# Patient Record
Sex: MALE | Race: WHITE | Employment: FULL TIME | URBAN - METROPOLITAN AREA
[De-identification: names, ages, dates, MRNs, and addresses within clinical notes are randomized per-mention and may not be internally consistent; named-entity substitution may affect disease eponyms.]

---

## 2018-08-10 ENCOUNTER — HOSPITAL ENCOUNTER (OUTPATIENT)
Age: 23
Discharge: HOME | End: 2018-08-10
Attending: FAMILY MEDICINE
Payer: COMMERCIAL

## 2018-08-10 VITALS
SYSTOLIC BLOOD PRESSURE: 113 MMHG | HEART RATE: 92 BPM | OXYGEN SATURATION: 98 % | RESPIRATION RATE: 16 BRPM | WEIGHT: 175 LBS | BODY MASS INDEX: 24 KG/M2 | DIASTOLIC BLOOD PRESSURE: 80 MMHG | TEMPERATURE: 98 F

## 2018-08-10 DIAGNOSIS — H61.21 IMPACTED CERUMEN OF RIGHT EAR: Primary | ICD-10-CM

## 2018-08-10 PROCEDURE — 99203 OFFICE O/P NEW LOW 30 MIN: CPT

## 2018-08-10 PROCEDURE — 69209 REMOVE IMPACTED EAR WAX UNI: CPT

## 2018-08-10 RX ORDER — BUPROPION HYDROCHLORIDE 300 MG/1
300 TABLET ORAL DAILY
COMMUNITY
End: 2020-12-24 | Stop reason: ALTCHOICE

## 2018-08-10 RX ORDER — FLUOXETINE HYDROCHLORIDE 40 MG/1
40 CAPSULE ORAL DAILY
COMMUNITY
End: 2020-12-24 | Stop reason: ALTCHOICE

## 2018-08-10 RX ORDER — ALPRAZOLAM 0.5 MG/1
0.5 TABLET ORAL NIGHTLY PRN
COMMUNITY
End: 2021-12-15

## 2018-08-10 NOTE — ED PROVIDER NOTES
Patient Seen in: [address] [facility]    History   Patient presents with:  Ear Problem Pain (neurosensory)    Stated Complaint: ear wax removal     41-year-old male comes in with concerns of right ear fullness, muffled hearing since this carmen time. No distress. HENT:   Head: Normocephalic and atraumatic.    Right Ear: External ear normal.   Left Ear: Tympanic membrane, external ear and ear canal normal.   Nose: Nose normal.   Mouth/Throat: Oropharynx is clear and moist and mucous membranes are

## 2019-02-13 ENCOUNTER — WALK IN (OUTPATIENT)
Dept: URGENT CARE | Age: 24
End: 2019-02-13

## 2019-02-13 DIAGNOSIS — Z11.1 SCREENING-PULMONARY TB: Primary | ICD-10-CM

## 2019-02-13 PROCEDURE — 86580 TB INTRADERMAL TEST: CPT | Performed by: NURSE PRACTITIONER

## 2019-02-15 ENCOUNTER — WALK IN (OUTPATIENT)
Dept: URGENT CARE | Age: 24
End: 2019-02-15

## 2019-02-15 DIAGNOSIS — Z11.1 ENCOUNTER FOR PPD SKIN TEST READING: Primary | ICD-10-CM

## 2019-02-15 LAB
INDURATION: 0 MM (ref 0–?)
SKIN TEST RESULT: NEGATIVE

## 2019-02-15 PROCEDURE — X1094 NO CHARGE VISIT: HCPCS | Performed by: NURSE PRACTITIONER

## 2019-02-25 ENCOUNTER — WALK IN (OUTPATIENT)
Dept: URGENT CARE | Age: 24
End: 2019-02-25

## 2019-02-25 DIAGNOSIS — Z11.1 SCREENING-PULMONARY TB: Primary | ICD-10-CM

## 2019-02-25 PROCEDURE — 86580 TB INTRADERMAL TEST: CPT | Performed by: NURSE PRACTITIONER

## 2019-02-27 ENCOUNTER — WALK IN (OUTPATIENT)
Dept: URGENT CARE | Age: 24
End: 2019-02-27

## 2019-02-27 DIAGNOSIS — Z11.1 ENCOUNTER FOR PPD SKIN TEST READING: Primary | ICD-10-CM

## 2019-02-27 LAB
INDURATION: 0 MM (ref 0–?)
SKIN TEST RESULT: NEGATIVE

## 2019-02-27 PROCEDURE — X1094 NO CHARGE VISIT: HCPCS | Performed by: NURSE PRACTITIONER

## 2019-07-10 ENCOUNTER — OFFICE VISIT (OUTPATIENT)
Dept: OTHER | Age: 24
End: 2019-07-10
Attending: FAMILY MEDICINE

## 2019-07-10 DIAGNOSIS — Z02.1 PRE-EMPLOYMENT EXAMINATION: Primary | ICD-10-CM

## 2019-07-26 ENCOUNTER — OCC HEALTH (OUTPATIENT)
Dept: OTHER | Age: 24
End: 2019-07-26
Attending: FAMILY MEDICINE

## 2019-07-26 DIAGNOSIS — Z02.1 PRE-EMPLOYMENT EXAMINATION: Primary | ICD-10-CM

## 2019-07-26 PROCEDURE — 86480 TB TEST CELL IMMUN MEASURE: CPT

## 2019-07-30 LAB
M TB TUBERC IFN-G BLD QL: NEGATIVE
M TB TUBERC IFN-G/MITOGEN IGNF BLD: 0 IU/ML
M TB TUBERC IFN-G/MITOGEN IGNF BLD: 0 IU/ML
M TB TUBERC IGNF/MITOGEN IGNF CONTROL: >10 IU/ML
MITOGEN IGNF BCKGRD COR BLD-ACNC: 0.03 IU/ML

## 2020-10-02 ENCOUNTER — WALK IN (OUTPATIENT)
Dept: URGENT CARE | Age: 25
End: 2020-10-02

## 2020-10-02 VITALS — TEMPERATURE: 98 F

## 2020-10-02 DIAGNOSIS — Z11.1 SCREENING-PULMONARY TB: ICD-10-CM

## 2020-10-02 DIAGNOSIS — Z23 NEED FOR VACCINATION: Primary | ICD-10-CM

## 2020-10-02 PROCEDURE — 90471 IMMUNIZATION ADMIN: CPT | Performed by: NURSE PRACTITIONER

## 2020-10-02 PROCEDURE — 86580 TB INTRADERMAL TEST: CPT | Performed by: NURSE PRACTITIONER

## 2020-10-02 PROCEDURE — 90686 IIV4 VACC NO PRSV 0.5 ML IM: CPT | Performed by: NURSE PRACTITIONER

## 2020-10-05 ENCOUNTER — WALK IN (OUTPATIENT)
Dept: URGENT CARE | Age: 25
End: 2020-10-05

## 2020-10-05 DIAGNOSIS — Z11.1 ENCOUNTER FOR PPD SKIN TEST READING: Primary | ICD-10-CM

## 2020-10-05 LAB
INDURATION: 0 MM (ref 0–?)
SKIN TEST RESULT: NEGATIVE

## 2020-10-05 PROCEDURE — X1094 NO CHARGE VISIT: HCPCS | Performed by: NURSE PRACTITIONER

## 2020-11-13 ENCOUNTER — APPOINTMENT (OUTPATIENT)
Dept: OTHER | Facility: HOSPITAL | Age: 25
End: 2020-11-13
Attending: PREVENTIVE MEDICINE

## 2020-12-24 ENCOUNTER — OFFICE VISIT (OUTPATIENT)
Dept: INTERNAL MEDICINE CLINIC | Facility: CLINIC | Age: 25
End: 2020-12-24
Payer: COMMERCIAL

## 2020-12-24 VITALS
DIASTOLIC BLOOD PRESSURE: 62 MMHG | OXYGEN SATURATION: 99 % | BODY MASS INDEX: 22.68 KG/M2 | TEMPERATURE: 97 F | HEIGHT: 73.23 IN | HEART RATE: 72 BPM | WEIGHT: 173 LBS | RESPIRATION RATE: 18 BRPM | SYSTOLIC BLOOD PRESSURE: 104 MMHG

## 2020-12-24 DIAGNOSIS — M54.9 CHRONIC UPPER BACK PAIN: Primary | ICD-10-CM

## 2020-12-24 DIAGNOSIS — F33.1 DEPRESSION, MAJOR, RECURRENT, MODERATE (HCC): ICD-10-CM

## 2020-12-24 DIAGNOSIS — H57.9 VISUAL SYMPTOMS: ICD-10-CM

## 2020-12-24 DIAGNOSIS — F41.1 GENERALIZED ANXIETY DISORDER: ICD-10-CM

## 2020-12-24 DIAGNOSIS — F40.01 AGORAPHOBIA WITH PANIC DISORDER: ICD-10-CM

## 2020-12-24 DIAGNOSIS — G89.29 CHRONIC UPPER BACK PAIN: Primary | ICD-10-CM

## 2020-12-24 PROCEDURE — 3074F SYST BP LT 130 MM HG: CPT

## 2020-12-24 PROCEDURE — 3008F BODY MASS INDEX DOCD: CPT

## 2020-12-24 PROCEDURE — 96127 BRIEF EMOTIONAL/BEHAV ASSMT: CPT

## 2020-12-24 PROCEDURE — 99204 OFFICE O/P NEW MOD 45 MIN: CPT | Performed by: INTERNAL MEDICINE

## 2020-12-24 PROCEDURE — 3078F DIAST BP <80 MM HG: CPT

## 2020-12-24 RX ORDER — METHYLPREDNISOLONE 4 MG/1
TABLET ORAL
Qty: 1 KIT | Refills: 0 | Status: SHIPPED | OUTPATIENT
Start: 2020-12-24 | End: 2021-02-08

## 2020-12-24 RX ORDER — ESCITALOPRAM OXALATE 10 MG/1
20 TABLET ORAL DAILY
COMMUNITY
Start: 2020-12-13 | End: 2021-04-30

## 2020-12-24 RX ORDER — CYCLOBENZAPRINE HCL 10 MG
10 TABLET ORAL 2 TIMES DAILY PRN
Qty: 14 TABLET | Refills: 1 | Status: SHIPPED | OUTPATIENT
Start: 2020-12-24 | End: 2020-12-31

## 2020-12-24 NOTE — PROGRESS NOTES
Alex Silver  11/25/1995    Patient presents with:  Establish Care: RG rm 1 New pt to establish care, discuss chronic back pain x 4 months  Eye Problem: floaters      SUBJECTIVE   Alex Silver is a 31year old male who presents with lower back pain. mouth daily. • ALPRAZolam 0.5 MG Oral Tab Take 0.5 mg by mouth nightly as needed for Anxiety (not taken daily).         No Known Allergies   Past Medical History:   Diagnosis Date   • Anxiety    • Depression       Patient Active Problem List:     Depr consider plain film evaluation pending symptom progression with above    General anxiety, depression, and agoraphobia:  Acute worsening attributed to current chronic pain  Following with psychiatry service and compliant with Escitalopram therapy  Behaviora

## 2020-12-30 ENCOUNTER — PATIENT MESSAGE (OUTPATIENT)
Dept: INTERNAL MEDICINE CLINIC | Facility: CLINIC | Age: 25
End: 2020-12-30

## 2020-12-30 DIAGNOSIS — M54.9 UPPER BACK PAIN: Primary | ICD-10-CM

## 2020-12-30 NOTE — TELEPHONE ENCOUNTER
LOV 12/24/20 with AD.   Copied notes:    ASSESSMENT AND PLAN:   John Ge is a 31year old male who presents with lower back pain.     Chronic lower back pain:  No focal neurological deficits  No sustained improvement with manipulations by chiropractic

## 2020-12-30 NOTE — TELEPHONE ENCOUNTER
From: Albina Trinidad  To: Gisella Rogers MD  Sent: 12/30/2020 3:26 PM CST  Subject: Visit Follow-up Question    Hi Dr. Lisa Kelsey,  So I finished the prednisone. I did notice a decrease in my overall pain.  However I jogged for a quarter mile and my pain came back

## 2020-12-31 NOTE — TELEPHONE ENCOUNTER
Agree with imaging given the lack of response to the proposed regimen. I believe the patient and I discussed both, however, more lower back pain at current, rather than upper back pain. Please confirm.  If his symptoms are more of upper back I can change th

## 2021-01-15 ENCOUNTER — IMMUNIZATION (OUTPATIENT)
Dept: LAB | Facility: HOSPITAL | Age: 26
End: 2021-01-15
Attending: EMERGENCY MEDICINE
Payer: COMMERCIAL

## 2021-01-15 DIAGNOSIS — Z23 NEED FOR VACCINATION: Primary | ICD-10-CM

## 2021-01-15 PROCEDURE — 0011A SARSCOV2 VAC 100MCG/0.5ML IM: CPT

## 2021-01-19 ENCOUNTER — HOSPITAL ENCOUNTER (OUTPATIENT)
Dept: GENERAL RADIOLOGY | Facility: HOSPITAL | Age: 26
Discharge: HOME | End: 2021-01-19
Attending: INTERNAL MEDICINE
Payer: COMMERCIAL

## 2021-01-19 DIAGNOSIS — M54.9 UPPER BACK PAIN: ICD-10-CM

## 2021-01-19 PROCEDURE — 72072 X-RAY EXAM THORAC SPINE 3VWS: CPT | Performed by: INTERNAL MEDICINE

## 2021-01-22 ENCOUNTER — TELEPHONE (OUTPATIENT)
Dept: INTERNAL MEDICINE CLINIC | Facility: CLINIC | Age: 26
End: 2021-01-22

## 2021-02-02 ENCOUNTER — PATIENT MESSAGE (OUTPATIENT)
Dept: INTERNAL MEDICINE CLINIC | Facility: CLINIC | Age: 26
End: 2021-02-02

## 2021-02-02 NOTE — TELEPHONE ENCOUNTER
From: Jhon Ge  To: Dianna Bloch, MD  Sent: 2/2/2021 8:01 AM CST  Subject: Non-Urgent Halina Kocher Dr. Malvina Glassman,  My condition has going downhill recently. The last week has been really bad for my back.  My days off are spent in bed trying to r

## 2021-02-02 NOTE — TELEPHONE ENCOUNTER
PT seen 12/24/2020 in clinic - any other recommendations or would you like to see again in clinic for follow up care eval?

## 2021-02-03 NOTE — TELEPHONE ENCOUNTER
Since symptoms have changed and worsened, repeat evaluation would be beneficial, however, given the severity I recommend evaluation by the neurosurgery service.  He may schedule with Tomer Roque, or another member of his team. If needed I can add the pa

## 2021-02-08 ENCOUNTER — OFFICE VISIT (OUTPATIENT)
Dept: SURGERY | Facility: CLINIC | Age: 26
End: 2021-02-08
Payer: COMMERCIAL

## 2021-02-08 VITALS
BODY MASS INDEX: 22.29 KG/M2 | SYSTOLIC BLOOD PRESSURE: 110 MMHG | DIASTOLIC BLOOD PRESSURE: 68 MMHG | WEIGHT: 170 LBS | HEIGHT: 73.23 IN

## 2021-02-08 DIAGNOSIS — M54.12 CERVICAL MYELOPATHY WITH CERVICAL RADICULOPATHY (HCC): Primary | ICD-10-CM

## 2021-02-08 DIAGNOSIS — M54.2 CERVICAL PAIN: ICD-10-CM

## 2021-02-08 DIAGNOSIS — G95.9 CERVICAL MYELOPATHY WITH CERVICAL RADICULOPATHY (HCC): Primary | ICD-10-CM

## 2021-02-08 DIAGNOSIS — M47.812 CERVICAL SPONDYLOSIS: ICD-10-CM

## 2021-02-08 PROCEDURE — 3074F SYST BP LT 130 MM HG: CPT | Performed by: PHYSICIAN ASSISTANT

## 2021-02-08 PROCEDURE — 3008F BODY MASS INDEX DOCD: CPT | Performed by: PHYSICIAN ASSISTANT

## 2021-02-08 PROCEDURE — 99204 OFFICE O/P NEW MOD 45 MIN: CPT | Performed by: PHYSICIAN ASSISTANT

## 2021-02-08 PROCEDURE — 3078F DIAST BP <80 MM HG: CPT | Performed by: PHYSICIAN ASSISTANT

## 2021-02-08 RX ORDER — CYCLOBENZAPRINE HCL 10 MG
TABLET ORAL
COMMUNITY
Start: 2021-01-15 | End: 2021-03-02

## 2021-02-08 NOTE — PATIENT INSTRUCTIONS
Refill policies:    • Allow 2-3 business days for refills; controlled substances may take longer.   • Contact your pharmacy at least 5 days prior to running out of medication and have them send an electronic request or submit request through the “request re Depending on your insurance carrier, approval may take 3-10 days. It is highly recommended patients contact their insurance carrier directly to determine coverage.   If test is done without insurance authorization, patient may be responsible for the entire flexed postures as much as possible  Wall glides  Follow-up after imaging and will implement a treatment plan  His questions were answered

## 2021-02-08 NOTE — PROGRESS NOTES
About 6 months no injury or accident    Constant pain Pain 4/10 most of the time  Flares up 6-7/10    Can't really find a position to be comfortable  Left arm n/t down to fingers  Nothing in legs    Between shouldblade and spine on Left side is the worst a

## 2021-02-08 NOTE — PROGRESS NOTES
[facility] Neurosurgery Consultation      HISTORY OF PRESENT Kavitha Borrego is a 31year old right-handed male here for spinal evaluation. 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   He has been here about 3 months before that he was doing other back pain denies any pain down the legs no numbness tingling or weakness in the legs no cramping in the legs. He denies dropping things. He denies any bowel or bladder incontinence but does disclose urinary urgency.   Denies night sweats night pain or une T5-6 spondylosis. Has a mild thoracic scoliosis. No cervical scoliosis. The lateral does show up into the cervical spine he has loss of lordosis. He has spondylosis at C5-6 with slight kyphosis. He has spondylosis at C3-4 and C4-5.     ASSESSMENT:  C3-

## 2021-02-13 ENCOUNTER — IMMUNIZATION (OUTPATIENT)
Dept: LAB | Facility: HOSPITAL | Age: 26
End: 2021-02-13
Attending: EMERGENCY MEDICINE
Payer: COMMERCIAL

## 2021-02-13 DIAGNOSIS — Z23 NEED FOR VACCINATION: Primary | ICD-10-CM

## 2021-02-13 PROCEDURE — 0012A SARSCOV2 VAC 100MCG/0.5ML IM: CPT

## 2021-02-15 ENCOUNTER — PATIENT MESSAGE (OUTPATIENT)
Dept: SURGERY | Facility: CLINIC | Age: 26
End: 2021-02-15

## 2021-02-15 NOTE — TELEPHONE ENCOUNTER
From: Alba Douglas  To: DEYANIRA Kurtz  Sent: 2/15/2021 1:19 PM CST  Subject: Other    Hello,  I scheduled the MRI with [facility]. With my work schedule and their availability, I won't be able to get in until February 24th.  I just wanted to g

## 2021-02-16 RX ORDER — HYDROCODONE BITARTRATE AND ACETAMINOPHEN 5; 325 MG/1; MG/1
1 TABLET ORAL EVERY 8 HOURS PRN
Qty: 60 TABLET | Refills: 0 | Status: SHIPPED | OUTPATIENT
Start: 2021-02-16 | End: 2021-05-24

## 2021-02-20 ENCOUNTER — HOSPITAL ENCOUNTER (OUTPATIENT)
Dept: GENERAL RADIOLOGY | Facility: HOSPITAL | Age: 26
Discharge: HOME | End: 2021-02-20
Attending: PHYSICIAN ASSISTANT
Payer: COMMERCIAL

## 2021-02-20 DIAGNOSIS — M47.812 CERVICAL SPONDYLOSIS: ICD-10-CM

## 2021-02-20 DIAGNOSIS — M54.12 CERVICAL MYELOPATHY WITH CERVICAL RADICULOPATHY (HCC): ICD-10-CM

## 2021-02-20 DIAGNOSIS — M54.2 CERVICAL PAIN: ICD-10-CM

## 2021-02-20 DIAGNOSIS — G95.9 CERVICAL MYELOPATHY WITH CERVICAL RADICULOPATHY (HCC): ICD-10-CM

## 2021-02-20 PROCEDURE — 72050 X-RAY EXAM NECK SPINE 4/5VWS: CPT | Performed by: PHYSICIAN ASSISTANT

## 2021-02-24 ENCOUNTER — TELEPHONE (OUTPATIENT)
Dept: SURGERY | Facility: CLINIC | Age: 26
End: 2021-02-24

## 2021-03-02 ENCOUNTER — TELEPHONE (OUTPATIENT)
Dept: SURGERY | Facility: CLINIC | Age: 26
End: 2021-03-02

## 2021-03-02 ENCOUNTER — OFFICE VISIT (OUTPATIENT)
Dept: SURGERY | Facility: CLINIC | Age: 26
End: 2021-03-02
Payer: COMMERCIAL

## 2021-03-02 VITALS
SYSTOLIC BLOOD PRESSURE: 106 MMHG | BODY MASS INDEX: 23.03 KG/M2 | DIASTOLIC BLOOD PRESSURE: 62 MMHG | WEIGHT: 170 LBS | HEIGHT: 72 IN | HEART RATE: 63 BPM

## 2021-03-02 DIAGNOSIS — G95.9 CERVICAL MYELOPATHY WITH CERVICAL RADICULOPATHY (HCC): Primary | ICD-10-CM

## 2021-03-02 DIAGNOSIS — M54.12 CERVICAL MYELOPATHY WITH CERVICAL RADICULOPATHY (HCC): Primary | ICD-10-CM

## 2021-03-02 PROCEDURE — 3008F BODY MASS INDEX DOCD: CPT | Performed by: PHYSICIAN ASSISTANT

## 2021-03-02 PROCEDURE — 99214 OFFICE O/P EST MOD 30 MIN: CPT | Performed by: PHYSICIAN ASSISTANT

## 2021-03-02 PROCEDURE — 3078F DIAST BP <80 MM HG: CPT | Performed by: PHYSICIAN ASSISTANT

## 2021-03-02 PROCEDURE — 3074F SYST BP LT 130 MM HG: CPT | Performed by: PHYSICIAN ASSISTANT

## 2021-03-02 NOTE — PROGRESS NOTES
[facility]   Follow-up      HISTORY OF PRESENT Shellie Zhong is a 31year old right-handed male he has been using the soft cervical collar he thinks it may be helping his weakness but has not helped his neck pain and scapular pain he still much worse at night it gets extremely worse with trying to run. Exercising seems to make it worse as well. It is better try to use a foam roller and roll it out and also a firm ball for rolling it out. He gets occasional right hip pain.   Denies any back spine 1/19/2021 shows T4-5 T5-6 spondylosis. Has a mild thoracic scoliosis. No cervical scoliosis. The lateral does show up into the cervical spine he has loss of lordosis. He has spondylosis at C5-6 with slight kyphosis.   He has spondylosis at C3-4 an

## 2021-03-02 NOTE — PATIENT INSTRUCTIONS
Refill policies:    • Allow 2-3 business days for refills; controlled substances may take longer.   • Contact your pharmacy at least 5 days prior to running out of medication and have them send an electronic request or submit request through the “request re Depending on your insurance carrier, approval may take 3-10 days. It is highly recommended patients contact their insurance carrier directly to determine coverage.   If test is done without insurance authorization, patient may be responsible for the entire possible  Wall glides  Follow-up 8 weeks  Physical therapy  If he is refractory to therapy will consider some cervical facet injections and possible an epidural    Images were reviewed his questions were answered.   He appears to have dynamic myelopathy wit

## 2021-03-02 NOTE — PROGRESS NOTES
Patient here for follow-up and imaging review. States pain has been gradually worsening. Rates pain 4/10. Bending over and bad posture worsens pain. Feels like soft collar hasn't been helping.

## 2021-03-02 NOTE — TELEPHONE ENCOUNTER
[employer] collar order received from provider. Patient face sheet, insurance documents and last office visit notes printed and faxed with order to [facility] to fax number:  [phone].

## 2021-04-02 ENCOUNTER — TELEPHONE (OUTPATIENT)
Dept: PHYSICAL THERAPY | Facility: HOSPITAL | Age: 26
End: 2021-04-02

## 2021-04-12 ENCOUNTER — APPOINTMENT (OUTPATIENT)
Dept: GENERAL RADIOLOGY | Age: 26
End: 2021-04-12
Attending: PHYSICIAN ASSISTANT
Payer: COMMERCIAL

## 2021-04-12 NOTE — ED PROVIDER NOTES
Patient Seen in: [facility]      History   Patient presents with:   Anxiety/Panic attack    Stated Complaint: Dizziness; synscope    HPI/Subjective:   HPI    19-year-old male with a history of anxiety presents to the immediate care af Rate and Rhythm: Normal rate and regular rhythm. Pulmonary:      Effort: Pulmonary effort is normal.      Breath sounds: Normal breath sounds. Abdominal:      Palpations: Abdomen is soft. Tenderness: There is no abdominal tenderness.    Musculoskel and understands the risks of their decisions. Patient continues to wish to leave against medical advice and is willing to accept the risks, which include the potential for death or permanent disability. Patient signed out Against Medical Advice [facility]).  Pa

## 2021-04-12 NOTE — ED INITIAL ASSESSMENT (HPI)
Hx of panic/anxiety attacks. Denies any ideations. 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 States has been feeling anxious x 5 days. Cannot explain why. Has appointment w [facility] tomorrow. Denies CP or SOB. Denies syncope or dizziness.   States just feels \"discon

## 2021-04-20 ENCOUNTER — OFFICE VISIT (OUTPATIENT)
Dept: PHYSICAL THERAPY | Age: 26
End: 2021-04-20
Attending: PHYSICIAN ASSISTANT
Payer: COMMERCIAL

## 2021-04-20 DIAGNOSIS — M54.12 CERVICAL MYELOPATHY WITH CERVICAL RADICULOPATHY (HCC): ICD-10-CM

## 2021-04-20 DIAGNOSIS — G95.9 CERVICAL MYELOPATHY WITH CERVICAL RADICULOPATHY (HCC): ICD-10-CM

## 2021-04-20 PROCEDURE — 97162 PT EVAL MOD COMPLEX 30 MIN: CPT

## 2021-04-20 PROCEDURE — 97110 THERAPEUTIC EXERCISES: CPT

## 2021-04-20 NOTE — PATIENT INSTRUCTIONS
Access Code: [email or web address]  URL: [email or web address]  Date: 04/20/2021  Prepared by: Azael Feliciano    Exercises  Standing Scapular Retraction - 3 x daily - 7 x weekly - 3 sets - 10 reps  Neck Flexion Stretch - 3 x daily - 7 x weekly - 3 sets - 10 re

## 2021-04-20 NOTE — PROGRESS NOTES
SPINE EVALUATION:   Referring Physician: Dr. Lino Ocampo  Diagnosis: Cervical myel[facility] (G95.9,M54.12)      C4-5-6 anterior listhesis with mild myelopathy and CA pain Date of Service: 4/20/2021     PATIENT SUMMARY   Elfredia Fears speech recognition software technology. Despite thorough proofreading, speech recognition errors could escape detection. If a word or phrase is confusing or out of context, please do not hesitate to call for   clarification.      He works as a surgical care strength, but dermatomes and myotomes appear intact. Functional deficits include but are not limited to prolonged sitting, walking, lifting, running. Signs and symptoms are consistent with diagnosis of cervical myelopathy and radiculopathy.  Pt and PT dis plan, expectations, and prognosis.  Pt was also provided recommendations for activity modifications and possible soreness after evaluation  Patient was instructed in and issued a HEP for: scap sets as tolerated, supine serratus punch, cervical AROM flexion limitation: None  Rehab Potential:good    FOTO: 57/100    Patient/Family/Caregiver was advised of these findings, precautions, and treatment options and has agreed to actively participate in planning and for this course of care.     Thank you for your refer

## 2021-04-29 ENCOUNTER — OFFICE VISIT (OUTPATIENT)
Dept: PHYSICAL THERAPY | Age: 26
End: 2021-04-29
Attending: PHYSICIAN ASSISTANT
Payer: COMMERCIAL

## 2021-04-29 DIAGNOSIS — M54.12 CERVICAL MYELOPATHY WITH CERVICAL RADICULOPATHY (HCC): ICD-10-CM

## 2021-04-29 DIAGNOSIS — G95.9 CERVICAL MYELOPATHY WITH CERVICAL RADICULOPATHY (HCC): ICD-10-CM

## 2021-04-29 PROCEDURE — 97112 NEUROMUSCULAR REEDUCATION: CPT

## 2021-04-29 PROCEDURE — 97110 THERAPEUTIC EXERCISES: CPT

## 2021-04-29 PROCEDURE — 97140 MANUAL THERAPY 1/> REGIONS: CPT

## 2021-04-29 NOTE — PATIENT INSTRUCTIONS
Access Code: [email or web address]  URL: [email or web address]  Date: 04/29/2021  Prepared by: Jemima Astorga    Exercises  Supine Chin Tuck - 3 x daily - 7 x weekly - 10 reps - 10-30 sec hold  Ulnar Nerve Flossing - 8 x daily - 7 x weekly - 15 reps - 1 sets

## 2021-04-29 NOTE — PROGRESS NOTES
Dx: Cervical myelopathy with cervical radiculopathy (HCC) (G95.9,M54.12)      C4-5-6 anterior listhesis with mild myelopathy and CA pain         Insurance (Authorized # of Visits):  BCBS EPO 60 visit limit         Authorizing Physician: Dr. Shasta Yang mobility to WNL to improve cervical ROM as well as promote upright posturing and decreased pain with prolonged sitting and walking   · Pt will report decreased frequency of headaches to <2x/week  · Pt will improve postural strength (mid/low trap) to 4+/5 t

## 2021-04-30 RX ORDER — ESCITALOPRAM OXALATE 10 MG/1
20 TABLET ORAL DAILY
Qty: 180 TABLET | Refills: 0 | Status: SHIPPED | OUTPATIENT
Start: 2021-04-30 | End: 2021-07-22

## 2021-04-30 NOTE — TELEPHONE ENCOUNTER
Prescription Refill Request - Patient advised can take 48-72 hours.     Name of Medication (strength, dose, qty requested:   escitalopram 10 MG Oral Tab   12/13/2020    Sig:   Take 20 mg by mouth daily.       Route:   Oral           Which pharmacy:[facility]  Have

## 2021-05-04 ENCOUNTER — APPOINTMENT (OUTPATIENT)
Dept: PHYSICAL THERAPY | Age: 26
End: 2021-05-04
Attending: PHYSICIAN ASSISTANT
Payer: COMMERCIAL

## 2021-05-04 ENCOUNTER — TELEPHONE (OUTPATIENT)
Dept: PHYSICAL THERAPY | Age: 26
End: 2021-05-04

## 2021-05-04 NOTE — TELEPHONE ENCOUNTER
LVM regarding missed appointment today. Left call back number if patient is able to come in later today.

## 2021-05-06 ENCOUNTER — APPOINTMENT (OUTPATIENT)
Dept: PHYSICAL THERAPY | Age: 26
End: 2021-05-06
Attending: PHYSICIAN ASSISTANT
Payer: COMMERCIAL

## 2021-05-06 ENCOUNTER — TELEPHONE (OUTPATIENT)
Dept: PHYSICAL THERAPY | Facility: HOSPITAL | Age: 26
End: 2021-05-06

## 2021-05-11 ENCOUNTER — PATIENT MESSAGE (OUTPATIENT)
Dept: SURGERY | Facility: CLINIC | Age: 26
End: 2021-05-11

## 2021-05-11 ENCOUNTER — TELEPHONE (OUTPATIENT)
Dept: PHYSICAL THERAPY | Age: 26
End: 2021-05-11

## 2021-05-11 DIAGNOSIS — M54.12 CERVICAL RADICULOPATHY: Primary | ICD-10-CM

## 2021-05-11 NOTE — TELEPHONE ENCOUNTER
Called regarding missed PT appointment this morning. This is his third missed appointment and we have been unable to contact him regarding these missed appointments. Future PT appointments will be cancelled at this time per department policy.  Encouraged to

## 2021-05-12 NOTE — TELEPHONE ENCOUNTER
S:  MyChart message from patient noted. B:   Per LOV notes from Dinora Hinson on 3/2:    \"MRI of the cervical spine again shows loss of lordosis. He has mild spondylosis at C4-5 and C5-6.   There is very subtle changes in the anterior cord shape at C

## 2021-05-12 NOTE — TELEPHONE ENCOUNTER
From: Williams Given  To: DEYANIRA Judd  Sent: 5/11/2021 4:48 PM CDT  Subject: Non-Urgent Medical Question    Hello . Sofy Hatfield,  I have been having a hard time doing physical therapy because of my shoulder pain and neck pain.  I have been have migra

## 2021-05-13 ENCOUNTER — APPOINTMENT (OUTPATIENT)
Dept: PHYSICAL THERAPY | Age: 26
End: 2021-05-13
Attending: PHYSICIAN ASSISTANT
Payer: COMMERCIAL

## 2021-05-18 ENCOUNTER — OFFICE VISIT (OUTPATIENT)
Dept: PHYSICAL THERAPY | Age: 26
End: 2021-05-18
Attending: PHYSICIAN ASSISTANT
Payer: COMMERCIAL

## 2021-05-18 ENCOUNTER — APPOINTMENT (OUTPATIENT)
Dept: PHYSICAL THERAPY | Age: 26
End: 2021-05-18
Attending: PHYSICIAN ASSISTANT
Payer: COMMERCIAL

## 2021-05-18 PROCEDURE — 97110 THERAPEUTIC EXERCISES: CPT

## 2021-05-18 PROCEDURE — 97140 MANUAL THERAPY 1/> REGIONS: CPT

## 2021-05-18 PROCEDURE — 97112 NEUROMUSCULAR REEDUCATION: CPT

## 2021-05-18 NOTE — PROGRESS NOTES
Dx: Cervical myelopathy with cervical radiculopathy (HCC) (G95.9,M54.12)      C4-5-6 anterior listhesis with mild myelopathy and CA pain         Insurance (Authorized # of Visits):  BCBS EPO 60 visit limit         Authorizing Physician: Dr. Yue Barth MD with DNF chin tuck on wall. No increase in pain following session.     Goals:   (to be met in 12 visits)   · Pt will improve cervical AROM flexion to 65 degrees to improve tolerance for looking down to tie shoes   · Pt will improve cervical AROM rotation to grade III  -L 1st rib mobilizations grade III  -Prone upper thoracic PAs grade III Manual  -L GH APand inferior glides grade III  -Cervical distraction grade III  -Suboccipital release  -L 1st rib mobilizations grade III    N Re-Ed  -Ulnar nerve glides in

## 2021-05-20 ENCOUNTER — APPOINTMENT (OUTPATIENT)
Dept: PHYSICAL THERAPY | Age: 26
End: 2021-05-20
Attending: PHYSICIAN ASSISTANT
Payer: COMMERCIAL

## 2021-05-20 ENCOUNTER — TELEPHONE (OUTPATIENT)
Dept: PHYSICAL THERAPY | Age: 26
End: 2021-05-20

## 2021-05-24 ENCOUNTER — TELEPHONE (OUTPATIENT)
Dept: NEUROLOGY | Facility: CLINIC | Age: 26
End: 2021-05-24

## 2021-05-24 ENCOUNTER — OFFICE VISIT (OUTPATIENT)
Dept: PAIN CLINIC | Facility: CLINIC | Age: 26
End: 2021-05-24
Payer: COMMERCIAL

## 2021-05-24 VITALS
WEIGHT: 170 LBS | HEART RATE: 80 BPM | SYSTOLIC BLOOD PRESSURE: 86 MMHG | BODY MASS INDEX: 23 KG/M2 | OXYGEN SATURATION: 98 % | DIASTOLIC BLOOD PRESSURE: 60 MMHG

## 2021-05-24 DIAGNOSIS — M89.8X1 CHRONIC SCAPULAR PAIN: Primary | ICD-10-CM

## 2021-05-24 DIAGNOSIS — G89.29 CHRONIC SCAPULAR PAIN: Primary | ICD-10-CM

## 2021-05-24 DIAGNOSIS — M54.12 CERVICAL RADICULOPATHY: Primary | ICD-10-CM

## 2021-05-24 PROCEDURE — 99243 OFF/OP CNSLTJ NEW/EST LOW 30: CPT | Performed by: ANESTHESIOLOGY

## 2021-05-24 PROCEDURE — 3078F DIAST BP <80 MM HG: CPT | Performed by: ANESTHESIOLOGY

## 2021-05-24 PROCEDURE — 3074F SYST BP LT 130 MM HG: CPT | Performed by: ANESTHESIOLOGY

## 2021-05-24 NOTE — TELEPHONE ENCOUNTER
Question Answer Comment   Anesthesia Type Local    Provider [facility]    Procedure Cervical EMERSON    Medical clearance requested (will send to Pain Navigator) No    Patient has Medicare coverage?  No    Comments (Please list entire procedure name h

## 2021-05-24 NOTE — TELEPHONE ENCOUNTER
Prior authorization request completed for: Cervical epidural steroid injection Authorization # NO authorization is required  Authorization dates: N/A  CPT codes approved: 47123  Number of visits/dates of service approved: 1  Physician: Evelia Pete  Facility:Lab/

## 2021-05-24 NOTE — PROGRESS NOTES
HPI/Subjective:   Patient ID: Filomena Davis is a 31year old male. HPI    History/Other:   Review of Systems  Current Outpatient Medications   Medication Sig Dispense Refill   • escitalopram 10 MG Oral Tab Take 2 tablets (20 mg total) by mouth daily.  1

## 2021-05-24 NOTE — H&P
Name: Raciel Cortés   : 1995   DOS: 2021     Chief complaint: Neck and scapular pain    History of present illness:  Raciel Cortés is a 31year old right-handed male who presents today for evaluation of neck pain.   This began in 2020 negative. Physical examination: Milad Duke is a 31year old male not in acute distress  BP (!) 86/60   Pulse 80   Wt 170 lb (77.1 kg)   SpO2 98%   BMI 23.06 kg/m²    The patient is awake, alert, oriented and corporative. He has a normal affect.  The patient including a trial of cervical epidural steroid injection. He may also benefit from trigger point injections of the scapular border.   He would like to move forward cervical epidural steroid injection first.  Additionally, he has a referral to an orthopedic

## 2021-05-27 ENCOUNTER — APPOINTMENT (OUTPATIENT)
Dept: GENERAL RADIOLOGY | Facility: HOSPITAL | Age: 26
End: 2021-05-27
Attending: ANESTHESIOLOGY
Payer: COMMERCIAL

## 2021-05-27 ENCOUNTER — HOSPITAL ENCOUNTER (OUTPATIENT)
Facility: HOSPITAL | Age: 26
Setting detail: HOSPITAL OUTPATIENT SURGERY
Discharge: HOME | End: 2021-05-27
Attending: ANESTHESIOLOGY | Admitting: ANESTHESIOLOGY
Payer: COMMERCIAL

## 2021-05-27 VITALS
OXYGEN SATURATION: 100 % | TEMPERATURE: 99 F | HEART RATE: 72 BPM | DIASTOLIC BLOOD PRESSURE: 73 MMHG | SYSTOLIC BLOOD PRESSURE: 115 MMHG | RESPIRATION RATE: 18 BRPM

## 2021-05-27 DIAGNOSIS — M54.12 CERVICAL RADICULOPATHY: ICD-10-CM

## 2021-05-27 PROCEDURE — B01B1ZZ FLUOROSCOPY OF SPINAL CORD USING LOW OSMOLAR CONTRAST: ICD-10-PCS | Performed by: ANESTHESIOLOGY

## 2021-05-27 PROCEDURE — 3E0R33Z INTRODUCTION OF ANTI-INFLAMMATORY INTO SPINAL CANAL, PERCUTANEOUS APPROACH: ICD-10-PCS | Performed by: ANESTHESIOLOGY

## 2021-05-27 RX ORDER — SODIUM CHLORIDE, SODIUM LACTATE, POTASSIUM CHLORIDE, CALCIUM CHLORIDE 600; 310; 30; 20 MG/100ML; MG/100ML; MG/100ML; MG/100ML
100 INJECTION, SOLUTION INTRAVENOUS CONTINUOUS
Status: DISCONTINUED | OUTPATIENT
Start: 2021-05-27 | End: 2021-05-27

## 2021-05-27 RX ORDER — IBUPROFEN 600 MG/1
600 TABLET ORAL EVERY 6 HOURS PRN
COMMUNITY
End: 2021-10-04

## 2021-05-27 RX ORDER — ACETAMINOPHEN 325 MG/1
650 TABLET ORAL EVERY 6 HOURS PRN
Status: DISCONTINUED | OUTPATIENT
Start: 2021-05-27 | End: 2021-05-27

## 2021-05-27 RX ORDER — SODIUM CHLORIDE 9 MG/ML
INJECTION INTRAVENOUS
Status: DISCONTINUED | OUTPATIENT
Start: 2021-05-27 | End: 2021-05-27

## 2021-05-27 RX ORDER — ONDANSETRON 2 MG/ML
4 INJECTION INTRAMUSCULAR; INTRAVENOUS ONCE AS NEEDED
Status: DISCONTINUED | OUTPATIENT
Start: 2021-05-27 | End: 2021-05-27

## 2021-05-27 RX ORDER — DEXAMETHASONE SODIUM PHOSPHATE 10 MG/ML
INJECTION, SOLUTION INTRAMUSCULAR; INTRAVENOUS
Status: DISCONTINUED | OUTPATIENT
Start: 2021-05-27 | End: 2021-05-27

## 2021-05-27 RX ORDER — MORPHINE SULFATE 4 MG/ML
2 INJECTION, SOLUTION INTRAMUSCULAR; INTRAVENOUS EVERY 2 HOUR PRN
Status: DISCONTINUED | OUTPATIENT
Start: 2021-05-27 | End: 2021-05-27

## 2021-05-27 RX ORDER — HYDROCODONE BITARTRATE AND ACETAMINOPHEN 5; 325 MG/1; MG/1
1 TABLET ORAL EVERY 4 HOURS PRN
Status: DISCONTINUED | OUTPATIENT
Start: 2021-05-27 | End: 2021-05-27

## 2021-05-27 RX ORDER — DIPHENHYDRAMINE HYDROCHLORIDE 50 MG/ML
50 INJECTION INTRAMUSCULAR; INTRAVENOUS ONCE AS NEEDED
Status: DISCONTINUED | OUTPATIENT
Start: 2021-05-27 | End: 2021-05-27

## 2021-05-27 RX ORDER — LIDOCAINE HYDROCHLORIDE 10 MG/ML
INJECTION, SOLUTION EPIDURAL; INFILTRATION; INTRACAUDAL; PERINEURAL
Status: DISCONTINUED | OUTPATIENT
Start: 2021-05-27 | End: 2021-05-27

## 2021-05-27 NOTE — OPERATIVE REPORT
[facility]  Operative Report  2021     Albina Amos Patient Status:  Hospital Outpatient Surgery    1995 MRN [id number]   Location [id number] [place] [address] Attending Deisi Mandujano MD   Hosp Day # 0 PCP Gisella Rogers MD was discharged to a responsible adult after discharge criteria were met. Complications: None. Follow up: The patient was followed in the pain clinic as needed basis.           Silverio Lema MD

## 2021-05-27 NOTE — H&P
History & Physical Examination    Patient Name: Devang Noel  MRN: [id number]  CSN: [id number]  YOB: 1995    Pre-Operative Diagnosis:  Cervical radiculopathy [M54.12]    Present Illness: Patient with cervical radiculopathy for epidural ster

## 2021-06-03 ENCOUNTER — APPOINTMENT (OUTPATIENT)
Dept: PHYSICAL THERAPY | Age: 26
End: 2021-06-03
Attending: PHYSICIAN ASSISTANT
Payer: COMMERCIAL

## 2021-06-03 ENCOUNTER — OFFICE VISIT (OUTPATIENT)
Dept: INTERNAL MEDICINE CLINIC | Facility: CLINIC | Age: 26
End: 2021-06-03
Payer: COMMERCIAL

## 2021-06-03 ENCOUNTER — TELEPHONE (OUTPATIENT)
Dept: PHYSICAL THERAPY | Facility: HOSPITAL | Age: 26
End: 2021-06-03

## 2021-06-03 VITALS
DIASTOLIC BLOOD PRESSURE: 62 MMHG | BODY MASS INDEX: 20.45 KG/M2 | OXYGEN SATURATION: 100 % | SYSTOLIC BLOOD PRESSURE: 108 MMHG | WEIGHT: 156 LBS | HEART RATE: 88 BPM | HEIGHT: 73.23 IN | RESPIRATION RATE: 16 BRPM | TEMPERATURE: 98 F

## 2021-06-03 DIAGNOSIS — Z13.220 SCREENING FOR LIPID DISORDERS: ICD-10-CM

## 2021-06-03 DIAGNOSIS — F41.1 GENERALIZED ANXIETY DISORDER: ICD-10-CM

## 2021-06-03 DIAGNOSIS — G89.29 CHRONIC SCAPULAR PAIN: ICD-10-CM

## 2021-06-03 DIAGNOSIS — Z00.00 ROUTINE GENERAL MEDICAL EXAMINATION AT A HEALTH CARE FACILITY: Primary | ICD-10-CM

## 2021-06-03 DIAGNOSIS — Z13.228 SCREENING FOR METABOLIC DISORDER: ICD-10-CM

## 2021-06-03 DIAGNOSIS — Z13.29 SCREENING FOR THYROID DISORDER: ICD-10-CM

## 2021-06-03 DIAGNOSIS — M89.8X1 CHRONIC SCAPULAR PAIN: ICD-10-CM

## 2021-06-03 DIAGNOSIS — Z13.0 SCREENING FOR BLOOD DISEASE: ICD-10-CM

## 2021-06-03 DIAGNOSIS — F40.01 AGORAPHOBIA WITH PANIC DISORDER: ICD-10-CM

## 2021-06-03 PROCEDURE — 3078F DIAST BP <80 MM HG: CPT | Performed by: INTERNAL MEDICINE

## 2021-06-03 PROCEDURE — 3074F SYST BP LT 130 MM HG: CPT | Performed by: INTERNAL MEDICINE

## 2021-06-03 PROCEDURE — 3008F BODY MASS INDEX DOCD: CPT | Performed by: INTERNAL MEDICINE

## 2021-06-03 PROCEDURE — 99395 PREV VISIT EST AGE 18-39: CPT | Performed by: INTERNAL MEDICINE

## 2021-06-03 NOTE — PROGRESS NOTES
Jazlyn Xie  11/25/1995    Patient presents with:  Physical: RG rm 7 Physical      HPI:   Jazlyn Xei is a 31year old male who presents for an annual physical examination.     The patient continues to experience a left scapular pain, that is present Depression, major, recurrent, moderate (HCC)     Agoraphobia with panic disorder     Generalized anxiety disorder     Chronic scapular pain     Past Surgical History:   Procedure Laterality Date   • OTHER SURGICAL HISTORY  2006    nasal reconstruction focal neurological deficits  Post evaluation by neurosurgery and pain   Services referred to orthopedic surgery service for continued evaluation and management    Agoraphobia and generalized anxiety disorder:  Anxiety has worsened, attributed to chronic pa

## 2021-06-08 ENCOUNTER — APPOINTMENT (OUTPATIENT)
Dept: PHYSICAL THERAPY | Age: 26
End: 2021-06-08
Attending: PHYSICIAN ASSISTANT
Payer: COMMERCIAL

## 2021-06-08 ENCOUNTER — TELEPHONE (OUTPATIENT)
Dept: ORTHOPEDICS CLINIC | Facility: CLINIC | Age: 26
End: 2021-06-08

## 2021-06-08 ENCOUNTER — TELEPHONE (OUTPATIENT)
Dept: PHYSICAL THERAPY | Facility: HOSPITAL | Age: 26
End: 2021-06-08

## 2021-06-08 DIAGNOSIS — M25.512 LEFT SHOULDER PAIN, UNSPECIFIED CHRONICITY: Primary | ICD-10-CM

## 2021-06-08 NOTE — TELEPHONE ENCOUNTER
New patient with pain in left scapula. No imaging of scapula. Had Xray & MRI of C-Spine in February. Please order xray as needed.     Future Appointments   Date Time Provider Kalia Amy   6/21/2021  8:40 AM Deisi Randle MD [facility]

## 2021-06-08 NOTE — TELEPHONE ENCOUNTER
Reviewed patients chart, xray orders are required. Order placed, patient informed to arrive 30 mins prior to patients appt to complete XR order.  Patient verbalized agreement

## 2021-06-10 ENCOUNTER — APPOINTMENT (OUTPATIENT)
Dept: PHYSICAL THERAPY | Age: 26
End: 2021-06-10
Attending: PHYSICIAN ASSISTANT
Payer: COMMERCIAL

## 2021-06-10 ENCOUNTER — TELEPHONE (OUTPATIENT)
Dept: PHYSICAL THERAPY | Facility: HOSPITAL | Age: 26
End: 2021-06-10

## 2021-06-11 ENCOUNTER — TELEPHONE (OUTPATIENT)
Dept: ORTHOPEDICS CLINIC | Facility: CLINIC | Age: 26
End: 2021-06-11

## 2021-06-11 NOTE — TELEPHONE ENCOUNTER
Dr. Kitty Canavan, the patient's PCP, is requesting that the patient get in sooner to see Dr. Ned Vega, if at all possible. The patient is an employee at THE [employer]. Per Dr. Kitty Canavan, the patient's injury is \"dehabilitating. \"    I have placed the patient on the waiting li

## 2021-06-11 NOTE — TELEPHONE ENCOUNTER
Sooner appt scheduled for patient  Future Appointments   Date Time Provider Kalia Reyes   6/15/2021  7:20 AM Ruben Briggs MD [facility]

## 2021-06-15 ENCOUNTER — HOSPITAL ENCOUNTER (OUTPATIENT)
Dept: GENERAL RADIOLOGY | Age: 26
Discharge: HOME | End: 2021-06-15
Attending: ORTHOPAEDIC SURGERY
Payer: COMMERCIAL

## 2021-06-15 ENCOUNTER — OFFICE VISIT (OUTPATIENT)
Dept: ORTHOPEDICS CLINIC | Facility: CLINIC | Age: 26
End: 2021-06-15
Payer: COMMERCIAL

## 2021-06-15 ENCOUNTER — APPOINTMENT (OUTPATIENT)
Dept: PHYSICAL THERAPY | Age: 26
End: 2021-06-15
Attending: PHYSICIAN ASSISTANT
Payer: COMMERCIAL

## 2021-06-15 DIAGNOSIS — M25.512 LEFT SHOULDER PAIN, UNSPECIFIED CHRONICITY: ICD-10-CM

## 2021-06-15 DIAGNOSIS — S43.102A SEPARATION OF LEFT ACROMIOCLAVICULAR JOINT, INITIAL ENCOUNTER: ICD-10-CM

## 2021-06-15 DIAGNOSIS — M89.9 SCAPULAR DYSFUNCTION: Primary | ICD-10-CM

## 2021-06-15 PROCEDURE — 73030 X-RAY EXAM OF SHOULDER: CPT | Performed by: ORTHOPAEDIC SURGERY

## 2021-06-15 PROCEDURE — 99204 OFFICE O/P NEW MOD 45 MIN: CPT | Performed by: ORTHOPAEDIC SURGERY

## 2021-06-15 NOTE — H&P
[facility] - ORTHOPEDICS  [facility] [address] [id number]  [phone]     NEW PATIENT VISIT - HISTORY AND PHYSICAL EXAMINATION     Name: Rosi Florentino   MRN: [id number]  Date: 6/15/2021     CC: Left shoulder SURGICAL HISTORY  2006    nasal reconstruction       FAMILY HX:  Family History   Problem Relation Age of Onset   • Depression Mother    • Depression Maternal Grandmother    • Depression Brother    • Stroke Neg    • Heart Disease Neg    • Cancer Neg Neurological:      General: No focal deficit present. Mental Status: Alert. Psychiatric:         Mood and Affect: Mood normal.     Examination of the left shoulder demonstrates:     Skin is intact, warm and dry.    Cervical:  Full ROM  Spurling's  Ne unspecified chronicity  PATIENT STATED HISTORY: (As transcribed by Technologist)  Patient is here for orthopedic evaluation. He states he has had chronic pain to his left shoulder over the past 8 months. He denies injury.  He states recently he has heard a dyskinesis. He has been evaluated by  of pain management and received a cervical epidural injection which helped alleviate his neuropathic symptoms.   He continues to have a vague dull aching pain along the medial border of the scapula as well as

## 2021-06-17 ENCOUNTER — APPOINTMENT (OUTPATIENT)
Dept: PHYSICAL THERAPY | Age: 26
End: 2021-06-17
Attending: PHYSICIAN ASSISTANT
Payer: COMMERCIAL

## 2021-06-22 ENCOUNTER — APPOINTMENT (OUTPATIENT)
Dept: PHYSICAL THERAPY | Age: 26
End: 2021-06-22
Attending: PHYSICIAN ASSISTANT
Payer: COMMERCIAL

## 2021-06-24 ENCOUNTER — APPOINTMENT (OUTPATIENT)
Dept: PHYSICAL THERAPY | Age: 26
End: 2021-06-24
Attending: PHYSICIAN ASSISTANT
Payer: COMMERCIAL

## 2021-06-24 ENCOUNTER — TELEPHONE (OUTPATIENT)
Dept: INTERNAL MEDICINE CLINIC | Facility: CLINIC | Age: 26
End: 2021-06-24

## 2021-06-24 NOTE — TELEPHONE ENCOUNTER
See pt message 6/5/21    Pt came in to dropped off FMLA forms to be completed. Forms placed in AD file folder at the  and a copy in AD upcoming appt folder in the back.

## 2021-06-29 ENCOUNTER — APPOINTMENT (OUTPATIENT)
Dept: PHYSICAL THERAPY | Age: 26
End: 2021-06-29
Attending: PHYSICIAN ASSISTANT
Payer: COMMERCIAL

## 2021-07-06 ENCOUNTER — OFFICE VISIT (OUTPATIENT)
Dept: ORTHOPEDICS CLINIC | Facility: CLINIC | Age: 26
End: 2021-07-06
Payer: COMMERCIAL

## 2021-07-06 DIAGNOSIS — G44.059 SHORT LASTING UNILATERAL NEURALGIFORM HEADACHE WITH CONJUNCTIVAL INJECTION AND TEARING (SUNCT), NOT INTRACTABLE: Primary | ICD-10-CM

## 2021-07-06 PROCEDURE — 99213 OFFICE O/P EST LOW 20 MIN: CPT | Performed by: ORTHOPAEDIC SURGERY

## 2021-07-06 NOTE — PROGRESS NOTES
[facility]  [address]       Name: Page Campos   MRN: [id number]  Date: 7/6/2021     REASON FOR VISIT: Follow-up evaluation for left scapular pain and discomf Radiographic Examination/Diagnostics:    No new imaging obtained at this visit.       IMPRESSION: Teodora Quevedo is a 31year old left scapular dyskinesis and severe pain and discomfort that is undergoing nonsurgical management with dedicated physical thera

## 2021-07-21 NOTE — TELEPHONE ENCOUNTER
Not accepted because no indication of what patient is looking for in terms of reduced schedule (time off for flairs, reduced duration of work hours, etc...). Agree with patient's need for FMLA.   Please contact patient for these details and will be resubmit

## 2021-07-22 RX ORDER — ESCITALOPRAM OXALATE 10 MG/1
20 TABLET ORAL DAILY
Qty: 180 TABLET | Refills: 1 | Status: SHIPPED | OUTPATIENT
Start: 2021-07-22 | End: 2021-10-04

## 2021-07-22 NOTE — TELEPHONE ENCOUNTER
Last VISIT 06/03/21    Last REFILL 04/30/21 qty 180 w/0 refills    Last LABS 10/05/20 TB done    No Future Appointments      Per PROTOCOL? Not on protocol      Please Approve or Deny.

## 2021-07-26 NOTE — TELEPHONE ENCOUNTER
Pt coming to  [facility] paperwork. Alondra Salvador stated she will place in front folders for pt.

## 2021-08-04 ENCOUNTER — OFFICE VISIT (OUTPATIENT)
Dept: ORTHOPEDICS CLINIC | Facility: CLINIC | Age: 26
End: 2021-08-04
Payer: COMMERCIAL

## 2021-08-04 DIAGNOSIS — M25.312 SHOULDER INSTABILITY, LEFT: Primary | ICD-10-CM

## 2021-08-04 PROCEDURE — 99213 OFFICE O/P EST LOW 20 MIN: CPT | Performed by: ORTHOPAEDIC SURGERY

## 2021-08-04 NOTE — PROGRESS NOTES
[facility]  [address] [place] [phone]       Name: Jackelin Hdez   MRN: [id number]  Date: 8/4/2021    REASON FOR VISIT: Follow-up evaluation for left scapular pain and discomfo thumb test.    The contralateral upper extremity is without limitation in range of motion or strength, no positive provocative maneuvers. Radiographic Examination/Diagnostics:    No new imaging obtained at this visit.       IMPRESSION: Dorothy clayton

## 2021-08-30 ENCOUNTER — PATIENT MESSAGE (OUTPATIENT)
Dept: ORTHOPEDICS CLINIC | Facility: CLINIC | Age: 26
End: 2021-08-30

## 2021-08-30 DIAGNOSIS — M25.312 SHOULDER INSTABILITY, LEFT: Primary | ICD-10-CM

## 2021-09-03 ENCOUNTER — PATIENT MESSAGE (OUTPATIENT)
Dept: ORTHOPEDICS CLINIC | Facility: CLINIC | Age: 26
End: 2021-09-03

## 2021-09-03 DIAGNOSIS — G25.89 SCAPULAR DYSKINESIS: ICD-10-CM

## 2021-09-03 DIAGNOSIS — M25.312 SHOULDER INSTABILITY, LEFT: Primary | ICD-10-CM

## 2021-09-22 ENCOUNTER — TELEPHONE (OUTPATIENT)
Dept: ORTHOPEDICS CLINIC | Facility: CLINIC | Age: 26
End: 2021-09-22

## 2021-09-22 DIAGNOSIS — M25.312 INSTABILITY OF LEFT SHOULDER JOINT: Primary | ICD-10-CM

## 2021-09-22 NOTE — TELEPHONE ENCOUNTER
----- Message from Corrinne Robertson, RMA sent at 9/22/2021  3:11 PM CDT -----  Regarding: Max Brennan please enter surgery scheduling sheet pt is set for 10/4/21.  Thank you

## 2021-09-22 NOTE — TELEPHONE ENCOUNTER
OR BOOKING SHEET SHOULDER  Name: Lan Huynh  MRN: [id number]   : 1995  Diagnosis:  [x]? Shoulder instability, left [M25.312]  Disposition:    [x]? Ambulatory  []? Overnight for SABI  []? Overnight for observation and pain control  []?  Inpatient

## 2021-09-22 NOTE — TELEPHONE ENCOUNTER
----- Message from KEATON Gr sent at 9/22/2021  3:11 PM CDT -----  Regarding: Sanchez Cueva please enter surgery scheduling sheet pt is set for 10/4/21.  Thank you

## 2021-09-22 NOTE — TELEPHONE ENCOUNTER
Surgeon: Dr. Gilford Aus    Date of Surgery: 10/4/21       Post Op Appt: 10/11/21    Facility: [facility]    Inpatient or Outpatient: Outpatient    Surgical Assistant: no    Preadmission Testing Ordered:  yes    Pre-Op Clearance Requested:   PCP: n/a   Card

## 2021-09-22 NOTE — TELEPHONE ENCOUNTER
OR BOOKING SHEET SHOULDER  Name: Talha River  MRN: [id number]   : 1995  Diagnosis:  [x] Shoulder instability, left [M25.312]  Disposition:    [x] Ambulatory  [] Overnight for SABI  [] Overnight for observation and pain control  [] Inpatient proc

## 2021-09-23 NOTE — TELEPHONE ENCOUNTER
Lets have him come in for a preop visit with Sincer and get his sling and the handouts. Thanks!    ZA

## 2021-09-23 NOTE — TELEPHONE ENCOUNTER
Patient has [employer], no prior auth or precert is needed for OP CPT code 02786 , for patient tracking only

## 2021-09-23 NOTE — TELEPHONE ENCOUNTER
Shai Gary's case has been scheduled/rescheduled at [id number] on 10/4/2021 at [facility]  Received: Mlacolm Vela RMA  Patient Name: Jermaine Rowell    MRN: [id number]     LEFT SHOULDER ARTHROSCOPY STABILITY.

## 2021-09-23 NOTE — TELEPHONE ENCOUNTER
Patient has [employer], no prior auth or precert is needed for OP CPT code 338894 , for patient tracking only

## 2021-09-23 NOTE — TELEPHONE ENCOUNTER
Future Appointments   Date Time Provider Kalia Reyes   9/28/2021 12:00 PM DEYANIRA Huerta [facility]

## 2021-09-29 ENCOUNTER — OFFICE VISIT (OUTPATIENT)
Dept: ORTHOPEDICS CLINIC | Facility: CLINIC | Age: 26
End: 2021-09-29
Payer: COMMERCIAL

## 2021-09-29 VITALS — HEART RATE: 85 BPM | OXYGEN SATURATION: 98 %

## 2021-09-29 DIAGNOSIS — G25.89 SCAPULAR DYSKINESIS: Primary | ICD-10-CM

## 2021-09-29 PROCEDURE — 99214 OFFICE O/P EST MOD 30 MIN: CPT | Performed by: ORTHOPAEDIC SURGERY

## 2021-09-29 RX ORDER — CHOLECALCIFEROL (VITAMIN D3) 125 MCG
CAPSULE ORAL DAILY
COMMUNITY
End: 2021-12-08

## 2021-09-29 RX ORDER — MELATONIN 10 MG
CAPSULE ORAL AS NEEDED
COMMUNITY
End: 2021-12-08

## 2021-09-29 RX ORDER — ACETAMINOPHEN 500 MG
1000 TABLET ORAL ONCE
Status: CANCELLED | OUTPATIENT
Start: 2021-09-29 | End: 2021-09-29

## 2021-10-01 ENCOUNTER — MED REC SCAN ONLY (OUTPATIENT)
Dept: ORTHOPEDICS CLINIC | Facility: CLINIC | Age: 26
End: 2021-10-01

## 2021-10-01 ENCOUNTER — LAB ENCOUNTER (OUTPATIENT)
Dept: LAB | Age: 26
End: 2021-10-01
Attending: ORTHOPAEDIC SURGERY
Payer: COMMERCIAL

## 2021-10-01 DIAGNOSIS — M25.312 SHOULDER INSTABILITY, LEFT: ICD-10-CM

## 2021-10-04 ENCOUNTER — ANESTHESIA EVENT (OUTPATIENT)
Dept: SURGERY | Facility: HOSPITAL | Age: 26
End: 2021-10-04
Payer: COMMERCIAL

## 2021-10-04 ENCOUNTER — HOSPITAL ENCOUNTER (OUTPATIENT)
Facility: HOSPITAL | Age: 26
Setting detail: HOSPITAL OUTPATIENT SURGERY
Discharge: HOME | End: 2021-10-04
Attending: ORTHOPAEDIC SURGERY | Admitting: ORTHOPAEDIC SURGERY
Payer: COMMERCIAL

## 2021-10-04 ENCOUNTER — ANESTHESIA (OUTPATIENT)
Dept: SURGERY | Facility: HOSPITAL | Age: 26
End: 2021-10-04
Payer: COMMERCIAL

## 2021-10-04 VITALS
OXYGEN SATURATION: 97 % | HEART RATE: 108 BPM | DIASTOLIC BLOOD PRESSURE: 77 MMHG | HEIGHT: 73 IN | BODY MASS INDEX: 21.21 KG/M2 | TEMPERATURE: 98 F | RESPIRATION RATE: 18 BRPM | SYSTOLIC BLOOD PRESSURE: 136 MMHG | WEIGHT: 160.06 LBS

## 2021-10-04 DIAGNOSIS — M25.312 SHOULDER INSTABILITY, LEFT: Primary | ICD-10-CM

## 2021-10-04 DIAGNOSIS — M25.312 INSTABILITY OF LEFT SHOULDER JOINT: ICD-10-CM

## 2021-10-04 PROCEDURE — 3E0T3BZ INTRODUCTION OF ANESTHETIC AGENT INTO PERIPHERAL NERVES AND PLEXI, PERCUTANEOUS APPROACH: ICD-10-PCS | Performed by: ANESTHESIOLOGY

## 2021-10-04 PROCEDURE — 0RQK4ZZ REPAIR LEFT SHOULDER JOINT, PERCUTANEOUS ENDOSCOPIC APPROACH: ICD-10-PCS | Performed by: ORTHOPAEDIC SURGERY

## 2021-10-04 PROCEDURE — 0RBK4ZZ EXCISION OF LEFT SHOULDER JOINT, PERCUTANEOUS ENDOSCOPIC APPROACH: ICD-10-PCS | Performed by: ORTHOPAEDIC SURGERY

## 2021-10-04 RX ORDER — DEXAMETHASONE SODIUM PHOSPHATE 10 MG/ML
INJECTION, SOLUTION INTRAMUSCULAR; INTRAVENOUS AS NEEDED
Status: DISCONTINUED | OUTPATIENT
Start: 2021-10-04 | End: 2021-10-04 | Stop reason: SURG

## 2021-10-04 RX ORDER — CLONIDINE 100 UG/ML
INJECTION, SOLUTION EPIDURAL AS NEEDED
Status: DISCONTINUED | OUTPATIENT
Start: 2021-10-04 | End: 2021-10-04 | Stop reason: SURG

## 2021-10-04 RX ORDER — HYDROMORPHONE HYDROCHLORIDE 1 MG/ML
0.4 INJECTION, SOLUTION INTRAMUSCULAR; INTRAVENOUS; SUBCUTANEOUS EVERY 5 MIN PRN
Status: DISCONTINUED | OUTPATIENT
Start: 2021-10-04 | End: 2021-10-04

## 2021-10-04 RX ORDER — CEFAZOLIN SODIUM/WATER 2 G/20 ML
SYRINGE (ML) INTRAVENOUS
Status: DISCONTINUED
Start: 2021-10-04 | End: 2021-10-04

## 2021-10-04 RX ORDER — BUPIVACAINE HYDROCHLORIDE 2.5 MG/ML
INJECTION, SOLUTION EPIDURAL; INFILTRATION; INTRACAUDAL AS NEEDED
Status: DISCONTINUED | OUTPATIENT
Start: 2021-10-04 | End: 2021-10-04 | Stop reason: SURG

## 2021-10-04 RX ORDER — ACETAMINOPHEN 500 MG
1000 TABLET ORAL ONCE
COMMUNITY
End: 2021-10-04

## 2021-10-04 RX ORDER — SCOLOPAMINE TRANSDERMAL SYSTEM 1 MG/1
1 PATCH, EXTENDED RELEASE TRANSDERMAL ONCE
Status: DISCONTINUED | OUTPATIENT
Start: 2021-10-04 | End: 2021-10-04

## 2021-10-04 RX ORDER — SODIUM CHLORIDE, SODIUM LACTATE, POTASSIUM CHLORIDE, CALCIUM CHLORIDE 600; 310; 30; 20 MG/100ML; MG/100ML; MG/100ML; MG/100ML
INJECTION, SOLUTION INTRAVENOUS CONTINUOUS
Status: DISCONTINUED | OUTPATIENT
Start: 2021-10-04 | End: 2021-10-04

## 2021-10-04 RX ORDER — BUPRENORPHINE HYDROCHLORIDE 0.32 MG/ML
INJECTION INTRAMUSCULAR; INTRAVENOUS AS NEEDED
Status: DISCONTINUED | OUTPATIENT
Start: 2021-10-04 | End: 2021-10-04 | Stop reason: SURG

## 2021-10-04 RX ORDER — TRAMADOL HYDROCHLORIDE 50 MG/1
TABLET ORAL
Qty: 20 TABLET | Refills: 1 | Status: SHIPPED | OUTPATIENT
Start: 2021-10-04 | End: 2021-12-15

## 2021-10-04 RX ORDER — GLYCOPYRROLATE 0.2 MG/ML
INJECTION, SOLUTION INTRAMUSCULAR; INTRAVENOUS AS NEEDED
Status: DISCONTINUED | OUTPATIENT
Start: 2021-10-04 | End: 2021-10-04 | Stop reason: SURG

## 2021-10-04 RX ORDER — LIDOCAINE HYDROCHLORIDE 10 MG/ML
INJECTION, SOLUTION EPIDURAL; INFILTRATION; INTRACAUDAL; PERINEURAL AS NEEDED
Status: DISCONTINUED | OUTPATIENT
Start: 2021-10-04 | End: 2021-10-04 | Stop reason: SURG

## 2021-10-04 RX ORDER — NALOXONE HYDROCHLORIDE 0.4 MG/ML
80 INJECTION, SOLUTION INTRAMUSCULAR; INTRAVENOUS; SUBCUTANEOUS AS NEEDED
Status: DISCONTINUED | OUTPATIENT
Start: 2021-10-04 | End: 2021-10-04

## 2021-10-04 RX ORDER — TRANEXAMIC ACID 10 MG/ML
1000 INJECTION, SOLUTION INTRAVENOUS ONCE
Status: COMPLETED | OUTPATIENT
Start: 2021-10-04 | End: 2021-10-04

## 2021-10-04 RX ORDER — ONDANSETRON 2 MG/ML
INJECTION INTRAMUSCULAR; INTRAVENOUS AS NEEDED
Status: DISCONTINUED | OUTPATIENT
Start: 2021-10-04 | End: 2021-10-04 | Stop reason: SURG

## 2021-10-04 RX ORDER — HYDROCODONE BITARTRATE AND ACETAMINOPHEN 5; 325 MG/1; MG/1
2 TABLET ORAL AS NEEDED
Status: DISCONTINUED | OUTPATIENT
Start: 2021-10-04 | End: 2021-10-04

## 2021-10-04 RX ORDER — DEXAMETHASONE SODIUM PHOSPHATE 4 MG/ML
VIAL (ML) INJECTION AS NEEDED
Status: DISCONTINUED | OUTPATIENT
Start: 2021-10-04 | End: 2021-10-04 | Stop reason: SURG

## 2021-10-04 RX ORDER — MELOXICAM 15 MG/1
15 TABLET ORAL DAILY
Qty: 14 TABLET | Refills: 1 | Status: SHIPPED | OUTPATIENT
Start: 2021-10-04 | End: 2021-12-08

## 2021-10-04 RX ORDER — HYDROCODONE BITARTRATE AND ACETAMINOPHEN 5; 325 MG/1; MG/1
1 TABLET ORAL AS NEEDED
Status: DISCONTINUED | OUTPATIENT
Start: 2021-10-04 | End: 2021-10-04

## 2021-10-04 RX ORDER — PHENYLEPHRINE HCL 10 MG/ML
VIAL (ML) INJECTION AS NEEDED
Status: DISCONTINUED | OUTPATIENT
Start: 2021-10-04 | End: 2021-10-04 | Stop reason: SURG

## 2021-10-04 RX ORDER — MIDAZOLAM HYDROCHLORIDE 1 MG/ML
INJECTION INTRAMUSCULAR; INTRAVENOUS AS NEEDED
Status: DISCONTINUED | OUTPATIENT
Start: 2021-10-04 | End: 2021-10-04 | Stop reason: SURG

## 2021-10-04 RX ORDER — KETOROLAC TROMETHAMINE 30 MG/ML
INJECTION, SOLUTION INTRAMUSCULAR; INTRAVENOUS AS NEEDED
Status: DISCONTINUED | OUTPATIENT
Start: 2021-10-04 | End: 2021-10-04 | Stop reason: SURG

## 2021-10-04 RX ORDER — CEFAZOLIN SODIUM/WATER 2 G/20 ML
2 SYRINGE (ML) INTRAVENOUS ONCE
Status: COMPLETED | OUTPATIENT
Start: 2021-10-04 | End: 2021-10-04

## 2021-10-04 RX ORDER — ONDANSETRON 4 MG/1
4 TABLET, ORALLY DISINTEGRATING ORAL EVERY 8 HOURS PRN
Qty: 8 TABLET | Refills: 0 | Status: SHIPPED | OUTPATIENT
Start: 2021-10-04 | End: 2021-10-08

## 2021-10-04 RX ORDER — SCOLOPAMINE TRANSDERMAL SYSTEM 1 MG/1
PATCH, EXTENDED RELEASE TRANSDERMAL
Status: COMPLETED
Start: 2021-10-04 | End: 2021-10-04

## 2021-10-04 RX ORDER — KETAMINE HYDROCHLORIDE 50 MG/ML
INJECTION, SOLUTION, CONCENTRATE INTRAMUSCULAR; INTRAVENOUS AS NEEDED
Status: DISCONTINUED | OUTPATIENT
Start: 2021-10-04 | End: 2021-10-04 | Stop reason: SURG

## 2021-10-04 RX ADMIN — BUPRENORPHINE HYDROCHLORIDE 150 MCG: 0.32 INJECTION INTRAMUSCULAR; INTRAVENOUS at 07:20:00

## 2021-10-04 RX ADMIN — LIDOCAINE HYDROCHLORIDE 2 ML: 10 INJECTION, SOLUTION EPIDURAL; INFILTRATION; INTRACAUDAL; PERINEURAL at 07:20:00

## 2021-10-04 RX ADMIN — PHENYLEPHRINE HCL 50 MCG: 10 MG/ML VIAL (ML) INJECTION at 08:20:00

## 2021-10-04 RX ADMIN — TRANEXAMIC ACID 1000 MG: 10 INJECTION, SOLUTION INTRAVENOUS at 07:34:00

## 2021-10-04 RX ADMIN — PHENYLEPHRINE HCL 50 MCG: 10 MG/ML VIAL (ML) INJECTION at 07:39:00

## 2021-10-04 RX ADMIN — GLYCOPYRROLATE 0.2 MG: 0.2 INJECTION, SOLUTION INTRAMUSCULAR; INTRAVENOUS at 07:15:00

## 2021-10-04 RX ADMIN — CLONIDINE 50 MCG: 100 INJECTION, SOLUTION EPIDURAL at 07:20:00

## 2021-10-04 RX ADMIN — ONDANSETRON 4 MG: 2 INJECTION INTRAMUSCULAR; INTRAVENOUS at 07:32:00

## 2021-10-04 RX ADMIN — PHENYLEPHRINE HCL 50 MCG: 10 MG/ML VIAL (ML) INJECTION at 08:00:00

## 2021-10-04 RX ADMIN — KETAMINE HYDROCHLORIDE 10 MG: 50 INJECTION, SOLUTION, CONCENTRATE INTRAMUSCULAR; INTRAVENOUS at 07:15:00

## 2021-10-04 RX ADMIN — KETOROLAC TROMETHAMINE 30 MG: 30 INJECTION, SOLUTION INTRAMUSCULAR; INTRAVENOUS at 07:34:00

## 2021-10-04 RX ADMIN — BUPIVACAINE HYDROCHLORIDE 15 ML: 2.5 INJECTION, SOLUTION EPIDURAL; INFILTRATION; INTRACAUDAL at 07:20:00

## 2021-10-04 RX ADMIN — PHENYLEPHRINE HCL 50 MCG: 10 MG/ML VIAL (ML) INJECTION at 07:45:00

## 2021-10-04 RX ADMIN — CEFAZOLIN SODIUM/WATER 2 G: 2 G/20 ML SYRINGE (ML) INTRAVENOUS at 07:34:00

## 2021-10-04 RX ADMIN — DEXAMETHASONE SODIUM PHOSPHATE 8 MG: 4 MG/ML VIAL (ML) INJECTION at 07:32:00

## 2021-10-04 RX ADMIN — DEXAMETHASONE SODIUM PHOSPHATE 2 MG: 10 INJECTION, SOLUTION INTRAMUSCULAR; INTRAVENOUS at 07:20:00

## 2021-10-04 RX ADMIN — PHENYLEPHRINE HCL 50 MCG: 10 MG/ML VIAL (ML) INJECTION at 08:15:00

## 2021-10-04 RX ADMIN — MIDAZOLAM HYDROCHLORIDE 2 MG: 1 INJECTION INTRAMUSCULAR; INTRAVENOUS at 07:15:00

## 2021-10-04 NOTE — H&P
The below referenced H&P was reviewed by [facility], MD, the patient was examined and no significant changes have occurred in the patient's condition since the H&P was performed.   I discussed with the patient and/or legal representative the potenti range of motion and neck supple. Cardiovascular:      Pulses: Normal pulses. Pulmonary:      Effort: Pulmonary effort is normal. No respiratory distress. Abdominal:      General: There is no distension. Skin:     General: Skin is warm.       Jaymie Hart particular we discussed risks that include, but are not limited to infection, blood loss, potential transient or permanent injury to nerves or blood vessels, joint stiffness, persistent pain, need for future operation, failure of healing, wound complicatio

## 2021-10-04 NOTE — ANESTHESIA PROCEDURE NOTES
Airway  Date/Time: 10/4/2021 7:25 AM  Urgency: elective      General Information and Staff    Patient location during procedure: OR  Anesthesiologist: Austin Lucio MD  Performed: anesthesiologist     Indications and Patient Condition  Indications for airw

## 2021-10-04 NOTE — OPERATIVE REPORT
[facility] OPERATIVE RECORD  ATTENDING SURGEON: Alexandra Mejía MD  ASSISTANT(S): Monet Plata MD  PRELIMINARY DIAGNOSIS:  1. Left Posterior shoulder instability     POSTOPERATIVE DIAGNOSIS:  1.   Left Posterior shoulder instability repair.     Operative and nonoperative options were discussed with him in my office and we ultimately agreed that surgery would provide the highest likelihood of symptomatic relief and functional improvement.   The risks and benefits of surgery as well as t established within the rotator internal and a probe was introduced into the glenohumeral joint. Diagnostic arthroscopy revealed:    Biceps Denison Intact, mild erythema down the groove. Superior labrum  mild subtle type I SLAP tearing.    Anterior labrum was then additionally fixed into a knotless fiber tack anchor. This was then successively shuttled back cephalad and fixed after multiple passes into the anterior glenoid labrum with a fiber tack anchor. This was then shuttled posteriorly and secured.   Quin Rand

## 2021-10-04 NOTE — ANESTHESIA POSTPROCEDURE EVALUATION
[address] Patient Status:  Hospital Outpatient Surgery   Age/Gender 31year old male MRN [id number]   [facility] SURGERY Attending Macie Hewitt MD   Hosp Day # 0 PCP Sol Turner MD       Anesthesia Post-op Note

## 2021-10-04 NOTE — ANESTHESIA PREPROCEDURE EVALUATION
PRE-OP EVALUATION    Patient Name: Renu Sanchez    Admit Diagnosis: Instability of left shoulder joint [M25.312]    Pre-op Diagnosis: Instability of left shoulder joint [M25.312]    LEFT SHOULDER ARTHROSCOPY, STABILIZATION    Anesthesia Procedure: LEFT S GI/Hepatic/Renal    Negative GI/hepatic/renal ROS. Cardiovascular    Negative cardiovascular ROS. Exercise tolerance: good     MET: >4                                           Endo/Other    Negative endo/other ROS. expectation for block duration was discussed as well (in some cases up to 72hr).  I also explained possible downsides of peripheral nerve blocks including failed block, prolonged nerve blockade leading to prolonged numbness in upper extremity and hand, with

## 2021-10-04 NOTE — PROGRESS NOTES
[facility]  [address] [place] [address]       Name: Humza Dumont   MRN: [id number]  Date: 9/29/21    REASON FOR VISIT: Preoperative Visit for Posterior Labral Repair     INTERV thumb test.    The contralateral upper extremity is without limitation in range of motion or strength, no positive provocative maneuvers. Radiographic Examination/Diagnostics:    No new imaging obtained at this visit.       IMPRESSION: Haley Bhatt is surgery and obtain any medical clearance information necessary. My pre-operative information packet, which details the process and answers many FAQ's will be provided. He was encouraged to call the office with any further questions or concerns.     FOLLOW-U

## 2021-10-04 NOTE — BRIEF OP NOTE
Pre-Operative Diagnosis: Instability of left shoulder joint [M25.312]     Post-Operative Diagnosis: Instability of left shoulder joint [M25.312]      Procedure Performed:   LEFT SHOULDER ARTHROSCOPY, STABILIZATION    Surgeon(s) and Role:     * Anusha Baugh

## 2021-10-04 NOTE — ANESTHESIA PROCEDURE NOTES
Regional Block  Performed by: Jostin Newman MD  Authorized by: Jostin Newman MD       General Information and Staff    Start Time:  10/4/2021 7:18 AM  End Time:  10/4/2021 7:21 AM  Anesthesiologist:  Jostin Newman MD  Performed by:   Anesthesiologist  Kanchan Buprenorphine    Local anesthetic deposited outside the brachial plexus sheath in the interscalene grove. Pt maintained verbal feedback throughout the procedure. No paraesthesias noted.

## 2021-10-07 ENCOUNTER — PATIENT MESSAGE (OUTPATIENT)
Dept: ORTHOPEDICS CLINIC | Facility: CLINIC | Age: 26
End: 2021-10-07

## 2021-10-07 RX ORDER — TRAMADOL HYDROCHLORIDE 50 MG/1
TABLET ORAL
Qty: 20 TABLET | Refills: 1 | Status: CANCELLED | OUTPATIENT
Start: 2021-10-07

## 2021-10-07 RX ORDER — ONDANSETRON 4 MG/1
4 TABLET, ORALLY DISINTEGRATING ORAL EVERY 8 HOURS PRN
Qty: 8 TABLET | Refills: 0 | Status: CANCELLED | OUTPATIENT
Start: 2021-10-07

## 2021-10-07 NOTE — TELEPHONE ENCOUNTER
LOV: 9/29/21   RTC:    Disp Refills Start End    traMADol 50 MG Oral Tab 20 tablet 1 10/4/2021     Sig: Please take 1 to 2 tablets every 4 hours as needed for pain.     Sent to pharmacy as: traMADol HCl 50 MG Oral Tablet Lilo Haile    E-Prescribing Status: Re

## 2021-10-07 NOTE — TELEPHONE ENCOUNTER
From: Ailyn Huddleston  To: Ailin Chaidez MD  Sent: 10/7/2021 4:23 PM CDT  Subject: Refill on Rx    Hey Dr. Reynaldo Chris,  I'm doing very well overall so far. I'm looking forward to meeting up on Monday.  I was just hoping I could get a refill on the tramadol

## 2021-10-08 RX ORDER — ONDANSETRON 4 MG/1
TABLET, ORALLY DISINTEGRATING ORAL
Qty: 8 TABLET | Refills: 0 | Status: SHIPPED | OUTPATIENT
Start: 2021-10-08 | End: 2021-12-15

## 2021-10-08 NOTE — TELEPHONE ENCOUNTER
LOV: 9/29/21   Filled:   Medication Quantity Refills Start End   ondansetron (ZOFRAN ODT) 4 MG Oral Tablet Dispersible 8 tablet 0 10/4/2021    Sig:   Take 1 tablet (4 mg total) by mouth every 8 (eight) hours as needed for Nausea.        Future Appointments

## 2021-10-08 NOTE — TELEPHONE ENCOUNTER
Rajiv Hui    I'm sorry, I didn't see there was refills on the scripts already.  I had them refilled.     Thank you

## 2021-10-11 ENCOUNTER — OFFICE VISIT (OUTPATIENT)
Dept: ORTHOPEDICS CLINIC | Facility: CLINIC | Age: 26
End: 2021-10-11
Payer: COMMERCIAL

## 2021-10-11 VITALS — OXYGEN SATURATION: 97 % | HEART RATE: 80 BPM

## 2021-10-11 DIAGNOSIS — Z98.890 S/P ARTHROSCOPY OF SHOULDER: Primary | ICD-10-CM

## 2021-10-11 PROCEDURE — 99024 POSTOP FOLLOW-UP VISIT: CPT | Performed by: PHYSICIAN ASSISTANT

## 2021-10-11 NOTE — PROGRESS NOTES
[facility]  [address] [facility] [address]       Name: Kaila Hidalgo   MRN: [id number]  Date: 10/11/2021     REASON FOR VISIT: First Post-Surgical Visit   Surgery: Left shoulder ar are clean dry intact no signs at pathology. Tegaderms were removed and Steri-Strips were maintained in place. The contralateral shoulder is without limitation in range of motion or strength, no positive provocative maneuvers.        IMPRESSION: Chente Balbuena

## 2021-10-28 ENCOUNTER — PATIENT MESSAGE (OUTPATIENT)
Dept: ORTHOPEDICS CLINIC | Facility: CLINIC | Age: 26
End: 2021-10-28

## 2021-10-28 NOTE — TELEPHONE ENCOUNTER
Sia Brown [place] 10/28/2021 3:47 PM CDT    It's really early after surgery, offer reassurance and advise that if it persists past his next appointment we can consider referring him to pain management. Recommend taking tylenol max and tramadol.  Let me kn

## 2021-10-31 ENCOUNTER — PATIENT MESSAGE (OUTPATIENT)
Dept: ORTHOPEDICS CLINIC | Facility: CLINIC | Age: 26
End: 2021-10-31

## 2021-11-01 DIAGNOSIS — Z98.890 S/P ARTHROSCOPY OF SHOULDER: Primary | ICD-10-CM

## 2021-11-01 RX ORDER — HYDROCODONE BITARTRATE AND ACETAMINOPHEN 5; 325 MG/1; MG/1
1 TABLET ORAL EVERY 6 HOURS PRN
Qty: 30 TABLET | Refills: 0 | Status: SHIPPED | OUTPATIENT
Start: 2021-11-01 | End: 2021-11-18

## 2021-11-01 NOTE — TELEPHONE ENCOUNTER
Verified voicemail reached with verbal consent signed. Attempted to call Teodora Quevedo regarding Norco refill/only use when absolutely needed. Reached voicemail, left voicemail and provided a detail message with my call back contact information.

## 2021-11-08 ENCOUNTER — OFFICE VISIT (OUTPATIENT)
Dept: ORTHOPEDICS CLINIC | Facility: CLINIC | Age: 26
End: 2021-11-08
Payer: COMMERCIAL

## 2021-11-08 VITALS — BODY MASS INDEX: 21.87 KG/M2 | HEART RATE: 88 BPM | OXYGEN SATURATION: 99 % | WEIGHT: 165 LBS | HEIGHT: 73 IN

## 2021-11-08 DIAGNOSIS — Z98.890 S/P ARTHROSCOPY OF SHOULDER: Primary | ICD-10-CM

## 2021-11-08 PROCEDURE — 99024 POSTOP FOLLOW-UP VISIT: CPT | Performed by: PHYSICIAN ASSISTANT

## 2021-11-08 PROCEDURE — 3008F BODY MASS INDEX DOCD: CPT | Performed by: PHYSICIAN ASSISTANT

## 2021-11-08 RX ORDER — PSEUDOEPHED/ACETAMINOPH/DIPHEN 30MG-500MG
TABLET ORAL
COMMUNITY
Start: 2021-10-04 | End: 2021-11-18

## 2021-11-08 RX ORDER — GABAPENTIN 300 MG/1
CAPSULE ORAL
Qty: 86 CAPSULE | Refills: 0 | Status: SHIPPED | OUTPATIENT
Start: 2021-11-08 | End: 2021-12-15

## 2021-11-08 NOTE — PROGRESS NOTES
[facility]  [address]       Name: Daniel Murphy   MRN: [id number]  Date: 11/08/2021    REASON FOR VISIT: Second Post-Surgical Visit   Surgery: Left shoulder ar flexion, 45 degrees of external rotation and symmetric internal rotation. He has no signs of posterior shoulder instability. His neurovascular seems intact.     The contralateral shoulder is without limitation in range of motion or strength, no positive p

## 2021-11-17 ENCOUNTER — PATIENT MESSAGE (OUTPATIENT)
Dept: ORTHOPEDICS CLINIC | Facility: CLINIC | Age: 26
End: 2021-11-17

## 2021-11-18 ENCOUNTER — PATIENT MESSAGE (OUTPATIENT)
Dept: ORTHOPEDICS CLINIC | Facility: CLINIC | Age: 26
End: 2021-11-18

## 2021-11-18 RX ORDER — HYDROCODONE BITARTRATE AND ACETAMINOPHEN 5; 325 MG/1; MG/1
1 TABLET ORAL EVERY 6 HOURS PRN
Qty: 20 TABLET | Refills: 0 | Status: SHIPPED | OUTPATIENT
Start: 2021-11-18 | End: 2021-12-30

## 2021-11-29 RX ORDER — HYDROCODONE BITARTRATE AND ACETAMINOPHEN 5; 325 MG/1; MG/1
1 TABLET ORAL EVERY 6 HOURS PRN
Qty: 20 TABLET | Refills: 0 | Status: SHIPPED | OUTPATIENT
Start: 2021-11-29 | End: 2021-12-07

## 2021-11-29 NOTE — TELEPHONE ENCOUNTER
Driss Santos RN 11/29/2021 8:05 AM CST      ----- Message -----  From: Yosef Euceda  Sent: 11/26/2021 12:26 PM CST  To: [facility] Clinical Pool  Subject: Wagner Shelton,  Am I able to get a script for Norco to last until I see you

## 2021-12-03 ENCOUNTER — TELEPHONE (OUTPATIENT)
Dept: ORTHOPEDICS CLINIC | Facility: CLINIC | Age: 26
End: 2021-12-03

## 2021-12-03 RX ORDER — CLONAZEPAM 0.5 MG/1
0.5 TABLET ORAL NIGHTLY PRN
Qty: 14 TABLET | Refills: 0 | Status: SHIPPED | OUTPATIENT
Start: 2021-12-03 | End: 2021-12-15

## 2021-12-03 NOTE — TELEPHONE ENCOUNTER
lmom for patient that FMLA will not be valid with [employer] per the rep the request has been denied as of 10.27.21 due to the previous MD office not submitting paperwork.     Advised patient per the rep he needs to contact [employer] @ [phone] and beg

## 2021-12-09 ENCOUNTER — PATIENT MESSAGE (OUTPATIENT)
Dept: ORTHOPEDICS CLINIC | Facility: CLINIC | Age: 26
End: 2021-12-09

## 2021-12-09 RX ORDER — MELOXICAM 15 MG/1
15 TABLET ORAL DAILY
Qty: 28 TABLET | Refills: 1 | Status: SHIPPED | OUTPATIENT
Start: 2021-12-09 | End: 2021-12-15

## 2021-12-09 RX ORDER — HYDROCODONE BITARTRATE AND ACETAMINOPHEN 5; 325 MG/1; MG/1
1 TABLET ORAL EVERY 6 HOURS PRN
Qty: 20 TABLET | Refills: 0 | Status: SHIPPED | OUTPATIENT
Start: 2021-12-09 | End: 2021-12-15

## 2021-12-09 NOTE — TELEPHONE ENCOUNTER
From: Cecilia Kingsley  To: Louisa Preciado MD  Sent: 12/9/2021 11:26 AM CST  Subject: Clarise Fabry Dr. Wells Hammer,  What is the best way to get prescriptions refilled?  I tried putting a request in 48 hours ago, I'm out of pain meds and Everytime I call your o

## 2021-12-09 NOTE — TELEPHONE ENCOUNTER
I had a detailed discussion with Collette Footman regarding goals of reducing narcotic requirement and weaning down to just Tylenol and meloxicam.  He expressed understanding agreement.   Ultimately both agreed that one last prescription would be optimal to help him w

## 2021-12-09 NOTE — TELEPHONE ENCOUNTER
MD Nando Newsome Dr.,  What is the best way to get prescriptions refilled?  I tried putting a request in 48 hours ago, I'm out of pain meds and Everytime I call your office they just tell me they can't do anything bu

## 2021-12-15 ENCOUNTER — OFFICE VISIT (OUTPATIENT)
Dept: PAIN CLINIC | Facility: CLINIC | Age: 26
End: 2021-12-15
Payer: COMMERCIAL

## 2021-12-15 VITALS
WEIGHT: 165 LBS | OXYGEN SATURATION: 100 % | BODY MASS INDEX: 22 KG/M2 | SYSTOLIC BLOOD PRESSURE: 110 MMHG | HEART RATE: 79 BPM | DIASTOLIC BLOOD PRESSURE: 70 MMHG

## 2021-12-15 DIAGNOSIS — G89.29 CHRONIC SCAPULAR PAIN: Primary | ICD-10-CM

## 2021-12-15 DIAGNOSIS — M25.312 SHOULDER INSTABILITY, LEFT: ICD-10-CM

## 2021-12-15 DIAGNOSIS — M89.8X1 CHRONIC SCAPULAR PAIN: Primary | ICD-10-CM

## 2021-12-15 PROCEDURE — 3074F SYST BP LT 130 MM HG: CPT | Performed by: ANESTHESIOLOGY

## 2021-12-15 PROCEDURE — 3078F DIAST BP <80 MM HG: CPT | Performed by: ANESTHESIOLOGY

## 2021-12-15 PROCEDURE — 99214 OFFICE O/P EST MOD 30 MIN: CPT | Performed by: ANESTHESIOLOGY

## 2021-12-15 RX ORDER — METHOCARBAMOL 500 MG/1
500 TABLET, FILM COATED ORAL 3 TIMES DAILY
Qty: 90 TABLET | Refills: 0 | Status: SHIPPED | OUTPATIENT
Start: 2021-12-15 | End: 2022-01-12

## 2021-12-15 NOTE — PROGRESS NOTES
Patient presents in office today with reported pain in left shoulder into the scapula. Shooting pain into left bicep.      Current pain level reported = 4/10    Last reported dose of norco last night        Narcotic Contract renewal na    Urine Drug screen

## 2021-12-15 NOTE — PROGRESS NOTES
Name: Alba Douglas   : 1995   DOS: 12/15/2021     Pain Clinic Follow Up Visit:   Patient presents with:   Follow - Up: discuss pain aftr surgery       Alba Douglas is a 31year old male with a history of chronic left-sided scapular pain status Chronic scapular pain  (primary encounter diagnosis)  Shoulder instability, left     The patient is a 58-year-old gentleman with a longstanding history of left-sided scapular pain.   Has been treated with multiple medications and physical therapy along wi

## 2021-12-16 ENCOUNTER — TELEPHONE (OUTPATIENT)
Dept: NEUROLOGY | Facility: CLINIC | Age: 26
End: 2021-12-16

## 2021-12-16 NOTE — TELEPHONE ENCOUNTER
Availity online for authorization of approval for above.  Notification or Prior Authorization is not required for the requested services  Transaction ID: [id number]  Transaction Date: Dec 16 4:14 pm Customer ID: [id number]    Prior authorization request complet

## 2021-12-20 ENCOUNTER — PATIENT MESSAGE (OUTPATIENT)
Dept: ORTHOPEDICS CLINIC | Facility: CLINIC | Age: 26
End: 2021-12-20

## 2021-12-20 NOTE — TELEPHONE ENCOUNTER
Question Answer   Anesthesia Type Local   Provider [facility]   Medical clearance requested (will send to Pain Navigator) No   Patient has Medicare coverage?  No   Comments (Please list entire procedure name here.) left scapular border TPI

## 2021-12-20 NOTE — TELEPHONE ENCOUNTER
From: Ainsley Schwab  To: Doc Astorga MD  Sent: 12/20/2021 11:19 AM CST  Subject: Missed Jaz Axon Dr Jalen Odom,  I'm sorry for missing my appointment this morning.  I got it mixed up with PT and then by that time it was way to late to make my appoint

## 2021-12-22 ENCOUNTER — OFFICE VISIT (OUTPATIENT)
Dept: ORTHOPEDICS CLINIC | Facility: CLINIC | Age: 26
End: 2021-12-22
Payer: COMMERCIAL

## 2021-12-22 ENCOUNTER — PATIENT MESSAGE (OUTPATIENT)
Dept: ORTHOPEDICS CLINIC | Facility: CLINIC | Age: 26
End: 2021-12-22

## 2021-12-22 VITALS — BODY MASS INDEX: 21.87 KG/M2 | WEIGHT: 165 LBS | HEIGHT: 73 IN

## 2021-12-22 DIAGNOSIS — M54.6 CHRONIC LEFT-SIDED THORACIC BACK PAIN: ICD-10-CM

## 2021-12-22 DIAGNOSIS — G89.29 CHRONIC LEFT-SIDED THORACIC BACK PAIN: ICD-10-CM

## 2021-12-22 DIAGNOSIS — Z98.890 S/P ARTHROSCOPY OF SHOULDER: Primary | ICD-10-CM

## 2021-12-22 PROCEDURE — 3008F BODY MASS INDEX DOCD: CPT | Performed by: PHYSICIAN ASSISTANT

## 2021-12-22 PROCEDURE — 99024 POSTOP FOLLOW-UP VISIT: CPT | Performed by: PHYSICIAN ASSISTANT

## 2021-12-22 NOTE — PROGRESS NOTES
[facility]  [facility] [phone]       Name: Noel Colón   MRN: [id number]  Date: 12/22/2021     REASON FOR VISIT: Third Post-Surgical Visit   Surgery: Left shoulder ar distress. Patient she is 165 degrees of forward flexion, 45 degrees of external rotation, internal rotation T12. Patient has 5 out of 5 rotator cuff strength.   Incisional sites are clean dry intact no signs at pathology    The contralateral shoulder is w

## 2021-12-28 NOTE — TELEPHONE ENCOUNTER
appt has been scheduled. Please reach out if there are any specific instructions to provide.     Future Appointments   Date Time Provider Kalia Amy   1/12/2022  9:00 AM MD Td Dangeloin [facility] [place]

## 2021-12-29 ENCOUNTER — TELEPHONE (OUTPATIENT)
Dept: INTERNAL MEDICINE CLINIC | Facility: CLINIC | Age: 26
End: 2021-12-29

## 2021-12-30 ENCOUNTER — OFFICE VISIT (OUTPATIENT)
Dept: INTERNAL MEDICINE CLINIC | Facility: CLINIC | Age: 26
End: 2021-12-30
Payer: COMMERCIAL

## 2021-12-30 VITALS
OXYGEN SATURATION: 97 % | WEIGHT: 173.19 LBS | HEIGHT: 73 IN | HEART RATE: 85 BPM | DIASTOLIC BLOOD PRESSURE: 64 MMHG | TEMPERATURE: 99 F | BODY MASS INDEX: 22.95 KG/M2 | SYSTOLIC BLOOD PRESSURE: 118 MMHG

## 2021-12-30 DIAGNOSIS — F33.1 DEPRESSION, MAJOR, RECURRENT, MODERATE (HCC): ICD-10-CM

## 2021-12-30 DIAGNOSIS — G89.29 CHRONIC SCAPULAR PAIN: Primary | ICD-10-CM

## 2021-12-30 DIAGNOSIS — M89.8X1 CHRONIC SCAPULAR PAIN: Primary | ICD-10-CM

## 2021-12-30 DIAGNOSIS — F41.1 GENERALIZED ANXIETY DISORDER: ICD-10-CM

## 2021-12-30 PROCEDURE — 99214 OFFICE O/P EST MOD 30 MIN: CPT | Performed by: INTERNAL MEDICINE

## 2021-12-30 PROCEDURE — 3074F SYST BP LT 130 MM HG: CPT | Performed by: INTERNAL MEDICINE

## 2021-12-30 PROCEDURE — 3008F BODY MASS INDEX DOCD: CPT | Performed by: INTERNAL MEDICINE

## 2021-12-30 PROCEDURE — 3078F DIAST BP <80 MM HG: CPT | Performed by: INTERNAL MEDICINE

## 2021-12-30 RX ORDER — ZOLPIDEM TARTRATE 10 MG/1
10 TABLET ORAL NIGHTLY PRN
Qty: 30 TABLET | Refills: 0 | Status: SHIPPED | OUTPATIENT
Start: 2021-12-30 | End: 2022-01-12

## 2021-12-30 RX ORDER — HYDROCODONE BITARTRATE AND ACETAMINOPHEN 5; 325 MG/1; MG/1
1 TABLET ORAL DAILY PRN
Qty: 14 TABLET | Refills: 0 | Status: SHIPPED | OUTPATIENT
Start: 2021-12-30 | End: 2022-01-12

## 2021-12-30 NOTE — TELEPHONE ENCOUNTER
Patient states he has been having trouble with insomnia and heart racing for several weeks off and on. Pt has been dealing with post surgical pain for left shoulder labral repair and pain between his scapula and spine, constant at 5-6, tightness upper ribs on the left. Dr Beulah Richey told him the surgery would probably not fix his pain, has been seeing Dr Judye Duverney but next appt is not until 1/12/22. Not sure if the insomnia issues and racing heart is due to anxiety, has been off narcotics for 2 weeks, is taking Diclofenac and methocarbamol only which takes the edge off the pain. Pt states he is in PT 2 x's per week. Pt states he takes epsom salt bathes daily, tries distraction to help with anxiety and lack of sleep. Pt denies chest pain, shortness of breath. Please advise.

## 2021-12-30 NOTE — TELEPHONE ENCOUNTER
Called patient and rings all the way through, unable to leave VM. mychart sent for patient to call us to discuss symptoms.

## 2021-12-31 NOTE — PROGRESS NOTES
Audra Dunne  11/25/1995    Patient presents with:  Sleep Problem: mn room 6 pt here for sleeping concerns and heart racing       Jovan Bond is a 31year old male who presents as a follow-up.     The patient has a history of chronic right • OTHER SURGICAL HISTORY  2006    nasal reconstruction      Social History    Tobacco Use      Smoking status: Never Smoker      Smokeless tobacco: Never Used    Vaping Use      Vaping Use: Never used    Alcohol use: Never    Drug use: Yes      Frequency Refills:  Requested Prescriptions     Signed Prescriptions Disp Refills   • HYDROcodone-acetaminophen (NORCO) 5-325 MG Oral Tab 14 tablet 0     Sig: Take 1 tablet by mouth daily as needed for Pain.    • zolpidem 10 MG Oral Tab 30 tablet 0     Sig: Take 1 ta

## 2022-01-05 ENCOUNTER — TELEPHONE (OUTPATIENT)
Dept: ORTHOPEDICS CLINIC | Facility: CLINIC | Age: 27
End: 2022-01-05

## 2022-01-05 NOTE — TELEPHONE ENCOUNTER
Patient called because he was notified that he had to pay a $25.00 fee for forms completion. $25.00 was collected via credit card at the [facility].

## 2022-01-06 NOTE — TELEPHONE ENCOUNTER
The Physical Capacity Assessment Form has been placed on [facility]'s desk pending completion and signature so I can fax on 01.07.22

## 2022-01-11 ENCOUNTER — TELEPHONE (OUTPATIENT)
Dept: PHYSICAL MEDICINE AND REHAB | Facility: CLINIC | Age: 27
End: 2022-01-11

## 2022-01-12 DIAGNOSIS — M25.312 SHOULDER INSTABILITY, LEFT: ICD-10-CM

## 2022-01-12 DIAGNOSIS — G89.29 CHRONIC SCAPULAR PAIN: Primary | ICD-10-CM

## 2022-01-12 DIAGNOSIS — M89.8X1 CHRONIC SCAPULAR PAIN: Primary | ICD-10-CM

## 2022-01-12 RX ORDER — ZOLPIDEM TARTRATE 10 MG/1
10 TABLET ORAL NIGHTLY PRN
Qty: 30 TABLET | Refills: 0 | Status: SHIPPED | OUTPATIENT
Start: 2022-01-12 | End: 2022-01-24

## 2022-01-12 RX ORDER — HYDROCODONE BITARTRATE AND ACETAMINOPHEN 5; 325 MG/1; MG/1
1 TABLET ORAL DAILY PRN
Qty: 14 TABLET | Refills: 0 | Status: SHIPPED | OUTPATIENT
Start: 2022-01-12 | End: 2022-01-24

## 2022-01-12 RX ORDER — METHOCARBAMOL 500 MG/1
500 TABLET, FILM COATED ORAL 3 TIMES DAILY
Qty: 90 TABLET | Refills: 0 | Status: SHIPPED | OUTPATIENT
Start: 2022-01-12

## 2022-01-12 NOTE — TELEPHONE ENCOUNTER
Medication: methocarbamol 500 MG Oral Tab    Date of last refill: 12/15/21        Medication: diclofenac 50 MG Oral Tab EC    Date of last refill: 12/15/21      Last office visit: 12/15/21  Due back to clinic per last office note:  na  Date next office vis

## 2022-01-12 NOTE — TELEPHONE ENCOUNTER
Last VISIT 12/30/21    Last CPE 06/03/21    Last REFILL 12/30/21  zolpidem 10 MG Oral Tab 30 tablet 0     HYDROcodone-acetaminophen (NORCO) 5-325 MG Oral Tab 14 tablet 0       Last LABS 10/01/21 Covid test done    No future appointments. Per PROTOCOL?

## 2022-01-24 RX ORDER — ZOLPIDEM TARTRATE 10 MG/1
10 TABLET ORAL NIGHTLY PRN
Qty: 30 TABLET | Refills: 0 | Status: SHIPPED | OUTPATIENT
Start: 2022-01-24

## 2022-01-24 RX ORDER — HYDROCODONE BITARTRATE AND ACETAMINOPHEN 5; 325 MG/1; MG/1
1 TABLET ORAL DAILY PRN
Qty: 14 TABLET | Refills: 0 | Status: SHIPPED | OUTPATIENT
Start: 2022-01-24 | End: 2022-02-02

## 2022-01-24 NOTE — TELEPHONE ENCOUNTER
Last VISIT 12/30/21    Last CPE 06/03/21    Last REFILL 01/12/22  zolpidem 10 MG Oral Tab 30 tablet 0     HYDROcodone-acetaminophen (NORCO) 5-325 MG Oral Tab 14 tablet 0        Last LABS 10/01/21 Covid test done    No future appointments.       Per PROTOCOL

## 2022-01-25 ENCOUNTER — PATIENT MESSAGE (OUTPATIENT)
Dept: ORTHOPEDICS CLINIC | Facility: CLINIC | Age: 27
End: 2022-01-25

## 2022-01-25 NOTE — TELEPHONE ENCOUNTER
From: Spring Ocampo  To: Rina Gastelum MD  Sent: 1/25/2022 3:32 PM CST  Subject: Return to Work Note    Hi Dr. Naga Archibald,  I need a note stating that I can return to work in 50 hours of you could please, or I will be terminated from my position at work.

## 2022-02-03 RX ORDER — HYDROCODONE BITARTRATE AND ACETAMINOPHEN 5; 325 MG/1; MG/1
1 TABLET ORAL DAILY PRN
Qty: 14 TABLET | Refills: 0 | Status: SHIPPED | OUTPATIENT
Start: 2022-02-03 | End: 2022-02-17

## 2022-02-03 NOTE — TELEPHONE ENCOUNTER
Last visit- 12/30/2021    Last refill- 01/24/2022 hydrocodone-acetaminophen (norco) 5-325mg QTY14 0R    Last labs- no recent labs     No future appointments.     Per Protocol- Failed    Please approve or deny

## 2022-02-08 ENCOUNTER — OFFICE VISIT (OUTPATIENT)
Dept: PHYSICAL MEDICINE AND REHAB | Facility: CLINIC | Age: 27
End: 2022-02-08
Payer: COMMERCIAL

## 2022-02-08 VITALS
BODY MASS INDEX: 22.93 KG/M2 | OXYGEN SATURATION: 98 % | HEART RATE: 79 BPM | DIASTOLIC BLOOD PRESSURE: 54 MMHG | WEIGHT: 173 LBS | SYSTOLIC BLOOD PRESSURE: 106 MMHG | HEIGHT: 73 IN

## 2022-02-08 DIAGNOSIS — M25.512 CHRONIC LEFT SHOULDER PAIN: ICD-10-CM

## 2022-02-08 DIAGNOSIS — G89.29 CHRONIC LEFT SHOULDER PAIN: ICD-10-CM

## 2022-02-08 DIAGNOSIS — R20.0 NUMBNESS AND TINGLING IN LEFT HAND: Primary | ICD-10-CM

## 2022-02-08 DIAGNOSIS — R20.2 NUMBNESS AND TINGLING IN LEFT HAND: Primary | ICD-10-CM

## 2022-02-08 PROCEDURE — 3008F BODY MASS INDEX DOCD: CPT | Performed by: PHYSICAL MEDICINE & REHABILITATION

## 2022-02-08 PROCEDURE — 99244 OFF/OP CNSLTJ NEW/EST MOD 40: CPT | Performed by: PHYSICAL MEDICINE & REHABILITATION

## 2022-02-08 PROCEDURE — 3074F SYST BP LT 130 MM HG: CPT | Performed by: PHYSICAL MEDICINE & REHABILITATION

## 2022-02-08 PROCEDURE — 3078F DIAST BP <80 MM HG: CPT | Performed by: PHYSICAL MEDICINE & REHABILITATION

## 2022-02-08 RX ORDER — FLUOXETINE HYDROCHLORIDE 20 MG/1
20 CAPSULE ORAL DAILY
COMMUNITY

## 2022-02-16 ENCOUNTER — TELEPHONE (OUTPATIENT)
Dept: PHYSICAL MEDICINE AND REHAB | Facility: CLINIC | Age: 27
End: 2022-02-16

## 2022-02-16 NOTE — TELEPHONE ENCOUNTER
Patient wants to speak to a nurse regarding what he can do for his pain. Please call to discuss, thanks.

## 2022-02-17 RX ORDER — CELECOXIB 200 MG/1
200 CAPSULE ORAL DAILY
Qty: 30 CAPSULE | Refills: 0 | Status: SHIPPED | OUTPATIENT
Start: 2022-02-17 | End: 2022-03-11

## 2022-02-17 NOTE — TELEPHONE ENCOUNTER
Pt notified of Celebrex recommendation. Pt agreeable to try medication. Notified to stop diclofenac and notify us if no improvement. Pt verbalized understanding and agrees with plan.

## 2022-02-17 NOTE — TELEPHONE ENCOUNTER
Location of Pain: generalized back pain, rib pain, radiates to neck muscles   Old or new pain: Old  Date Pain Began: worsening over past 3-4 weeks, pain now constant    Numbness to left hand at baseline. Denies any new symptoms such as new N/T , weakness, or bb incontinence. Numeric Rating Scale:  Pain at Present: 6/10, constant  Minimum Pain: 5/10, when lying down and applies heat pad  Maximum Pain: 7/10, with activity such as lifting objects or doing housework    Quality of Pain: soreness, deep rib pain, sharp     Current pain treatment: Diclofenac, methocarbamol, Norco 1x day, Heat pads, Epsom salt baths. LOV: 2/8/22   LOV plan:   Recommend EMG of the left upper extremity for further evaluation of symptoms, MRI of the left shoulder. Depending on the findings consider TPI of periscapular musculature. NOV: 2/23/2022 Mliss Beverage, DO for EMG LUE  Pt is scheduled for shoulder MRI on 2/22/22 at [facility]. Reminded pt to bring in CD results. Summary of patient request: Advise on pain relief. Current pain regimen not working.

## 2022-02-18 ENCOUNTER — TELEPHONE (OUTPATIENT)
Dept: INTERNAL MEDICINE CLINIC | Facility: CLINIC | Age: 27
End: 2022-02-18

## 2022-02-18 RX ORDER — ZOLPIDEM TARTRATE 10 MG/1
10 TABLET ORAL NIGHTLY PRN
Qty: 30 TABLET | Refills: 0 | Status: SHIPPED | OUTPATIENT
Start: 2022-02-18 | End: 2022-03-19

## 2022-02-18 RX ORDER — HYDROCODONE BITARTRATE AND ACETAMINOPHEN 5; 325 MG/1; MG/1
1 TABLET ORAL DAILY PRN
Qty: 14 TABLET | Refills: 0 | Status: SHIPPED | OUTPATIENT
Start: 2022-02-18

## 2022-02-18 NOTE — TELEPHONE ENCOUNTER
Last VISIT 12/30/21    Last CPE 06/03/21    Last REFILL 01/24/22   zolpidem 10 MG Oral Tab 30 tablet 0   02/03/22   HYDROcodone-acetaminophen (NORCO) 5-325 MG Oral Tab 14 tablet 0       Last LABS 10/01/21 Covid test done    No Future Appointments      Per PROTOCOL? Not on protocol      Please Approve or Deny.

## 2022-02-18 NOTE — TELEPHONE ENCOUNTER
CMA called and discussed with pharmacy. Per pharmacy, pt picked up on 2/7/22 and is not due for refill until 2/20/22. This can be pushed through to fill early but pt will have to pay out of pocket. CMA called and discussed this with pt, pt is aware and ok to pay out of pocket and would like early fill called in. CMA called pharmacy back and advised ok to fill early.

## 2022-02-18 NOTE — TELEPHONE ENCOUNTER
Pt calling stating [facility] [place] [facility]'t [place] RX as it's too soon from the last script. Pt states he talked to AD last night and he said it was ok, Can we call pharmacy to give authorization to fill. HYDROcodone-acetaminophen (NORCO) 5-325 MG Oral Tab 14 tablet 0 2/18/2022     Sig - Route:  Take 1 tablet by mouth daily as needed for Pain. - Oral    Sent to pharmacy as: HYDROcodone-Acetaminophen 5-325 MG Oral Tablet    Earliest Fill Date: 2/18/2022

## 2022-02-23 ENCOUNTER — PROCEDURE VISIT (OUTPATIENT)
Dept: PHYSICAL MEDICINE AND REHAB | Facility: CLINIC | Age: 27
End: 2022-02-23
Payer: COMMERCIAL

## 2022-02-23 ENCOUNTER — TELEPHONE (OUTPATIENT)
Dept: PHYSICAL MEDICINE AND REHAB | Facility: CLINIC | Age: 27
End: 2022-02-23

## 2022-02-23 ENCOUNTER — MED REC SCAN ONLY (OUTPATIENT)
Dept: PHYSICAL MEDICINE AND REHAB | Facility: CLINIC | Age: 27
End: 2022-02-23

## 2022-02-23 DIAGNOSIS — M79.89 SOFT TISSUE MASS: Primary | ICD-10-CM

## 2022-02-23 DIAGNOSIS — G25.89 SCAPULAR DYSKINESIS: ICD-10-CM

## 2022-02-23 PROCEDURE — 95908 NRV CNDJ TST 3-4 STUDIES: CPT | Performed by: PHYSICAL MEDICINE & REHABILITATION

## 2022-02-23 PROCEDURE — 95886 MUSC TEST DONE W/N TEST COMP: CPT | Performed by: PHYSICAL MEDICINE & REHABILITATION

## 2022-02-23 NOTE — PROGRESS NOTES
Results discussed with patient upon conclusion of the visit. We also discussed MRI of the left shoulder, which in addition to post-op findings also made mention of a soft tissue mass along the posterior margins of the left posterior upper ribs, and MRI of the left upper chest wall recommended for further evaluation. I ordered an MRI of the chest for further evaluation.     Benita Hinson DO  Physical Medicine and [address]

## 2022-02-23 NOTE — TELEPHONE ENCOUNTER
[employer] for authorization of approval for MRI CHEST (W+WO) (CPT=71552). Authorization is not required. Transaction ID: [id number]. Pt.  informed.

## 2022-02-25 ENCOUNTER — TELEPHONE (OUTPATIENT)
Dept: INTERNAL MEDICINE CLINIC | Facility: CLINIC | Age: 27
End: 2022-02-25

## 2022-02-25 RX ORDER — HYDROCODONE BITARTRATE AND ACETAMINOPHEN 5; 325 MG/1; MG/1
1 TABLET ORAL 2 TIMES DAILY PRN
Qty: 28 TABLET | Refills: 0 | Status: SHIPPED | OUTPATIENT
Start: 2022-02-25 | End: 2022-03-11

## 2022-02-25 NOTE — TELEPHONE ENCOUNTER
Pt stated he's following up on his [employer] message from 2/23 and would like response back before the weekend.

## 2022-02-25 NOTE — PROGRESS NOTES
[facility] area [facility] of [place].  Pt asked that alt Rx for Nocro be sent to [facility]. I sent to [facility] as pt requested.

## 2022-03-01 ENCOUNTER — MED REC SCAN ONLY (OUTPATIENT)
Dept: ORTHOPEDICS CLINIC | Facility: CLINIC | Age: 27
End: 2022-03-01

## 2022-03-03 ENCOUNTER — PATIENT MESSAGE (OUTPATIENT)
Dept: PHYSICAL MEDICINE AND REHAB | Facility: CLINIC | Age: 27
End: 2022-03-03

## 2022-03-03 NOTE — TELEPHONE ENCOUNTER
From: Lew Davis  To: Giuseppe Mcmillan DO  Sent: 3/3/2022 12:19 PM CST  Subject: Azeb Raphael,  I was recently let go from [facility] for complicated reasons but because of this, I don't have health insurance until paperwork comes from the hospital and I file for it. So I will schedule that MRI as soon as I can. I'm very eager.   Thank you,  James Fitzgerald

## 2022-03-11 ENCOUNTER — PATIENT MESSAGE (OUTPATIENT)
Dept: INTERNAL MEDICINE CLINIC | Facility: CLINIC | Age: 27
End: 2022-03-11

## 2022-03-11 RX ORDER — HYDROCODONE BITARTRATE AND ACETAMINOPHEN 5; 325 MG/1; MG/1
1 TABLET ORAL 2 TIMES DAILY PRN
Qty: 28 TABLET | Refills: 0 | Status: CANCELLED | OUTPATIENT
Start: 2022-03-11

## 2022-03-11 RX ORDER — HYDROCODONE BITARTRATE AND ACETAMINOPHEN 5; 325 MG/1; MG/1
1 TABLET ORAL 2 TIMES DAILY PRN
Qty: 28 TABLET | Refills: 0 | Status: SHIPPED | OUTPATIENT
Start: 2022-03-11 | End: 2022-03-19

## 2022-03-11 RX ORDER — HYDROCODONE BITARTRATE AND ACETAMINOPHEN 5; 325 MG/1; MG/1
1 TABLET ORAL 2 TIMES DAILY PRN
Qty: 28 TABLET | Refills: 0 | Status: SHIPPED | OUTPATIENT
Start: 2022-03-11 | End: 2022-03-11

## 2022-03-14 RX ORDER — CELECOXIB 200 MG/1
200 CAPSULE ORAL DAILY PRN
Qty: 30 CAPSULE | Refills: 0 | Status: SHIPPED | OUTPATIENT
Start: 2022-03-14

## 2022-03-15 RX ORDER — ESCITALOPRAM OXALATE 10 MG/1
10 TABLET ORAL DAILY
Qty: 90 TABLET | Refills: 1 | Status: SHIPPED | OUTPATIENT
Start: 2022-03-15

## 2022-03-21 RX ORDER — ZOLPIDEM TARTRATE 10 MG/1
10 TABLET ORAL NIGHTLY PRN
Qty: 30 TABLET | Refills: 0 | Status: SHIPPED | OUTPATIENT
Start: 2022-03-21

## 2022-03-21 RX ORDER — HYDROCODONE BITARTRATE AND ACETAMINOPHEN 5; 325 MG/1; MG/1
1 TABLET ORAL 2 TIMES DAILY PRN
Qty: 28 TABLET | Refills: 0 | Status: SHIPPED | OUTPATIENT
Start: 2022-03-21

## 2022-03-21 NOTE — TELEPHONE ENCOUNTER
Last VISIT 12/30/21     Last CPE 06/03/21    Last REFILL 02/18/22  zolpidem 10 MG Oral Tab 30 tablet 0   03/11/22   HYDROcodone-acetaminophen (NORCO) 5-325 MG Oral Tab 28 tablet 0      Last LABS  10/01/21 Covid test done    No future appointments. Per PROTOCOL? Not on protocol      Please Approve or Deny.

## 2022-04-05 ENCOUNTER — TELEPHONE (OUTPATIENT)
Dept: PHYSICAL MEDICINE AND REHAB | Facility: CLINIC | Age: 27
End: 2022-04-05

## 2022-04-05 ENCOUNTER — MED REC SCAN ONLY (OUTPATIENT)
Dept: PHYSICAL MEDICINE AND REHAB | Facility: CLINIC | Age: 27
End: 2022-04-05

## 2022-04-06 RX ORDER — HYDROCODONE BITARTRATE AND ACETAMINOPHEN 5; 325 MG/1; MG/1
1 TABLET ORAL 2 TIMES DAILY PRN
Qty: 28 TABLET | Refills: 0 | Status: SHIPPED | OUTPATIENT
Start: 2022-04-06

## 2022-04-06 NOTE — TELEPHONE ENCOUNTER
Please inquire regarding a plan for his chronic symptoms. Is he planning to follow-up with the orthopedic surgery, pain, or PM&R services? I will refill his medication for a short duration as a bridge to the next step, but I cannot continue to manage his chronic pain with narcotic therapy.

## 2022-04-07 NOTE — TELEPHONE ENCOUNTER
Patient has been seeing Tamar Campuzano, seen by Alma Moreland on 2/8/2022 & 2/23/2022 and he no showed an appointment he had scheduled with pain management.

## 2022-04-08 NOTE — TELEPHONE ENCOUNTER
Left voice mail message for patient Dr Elizabeth Orellana  has reviewed MRI and has recommendations. Advised to return call to office. NOV none.

## 2022-04-08 NOTE — TELEPHONE ENCOUNTER
----- Message from Vivek Mg sent at 4/7/2022  7:13 AM CDT -----    ----- Message -----  From: Madan Chase DO  Sent: 4/6/2022   9:15 PM CDT  To: Miroslava Jiménez Nurse    Please let the patient know there is mild edema along the rhomboids muscles, muscles that stabilize the shoulder blade. Please have him follow up; we will consider trigger point injections along the muscle.

## 2022-04-11 ENCOUNTER — TELEPHONE (OUTPATIENT)
Dept: NEUROLOGY | Facility: CLINIC | Age: 27
End: 2022-04-11

## 2022-04-11 NOTE — TELEPHONE ENCOUNTER
Left rhomboid trigger point injections CPT CODE 21251 (79455 in needed),      Status: APPROVED. Pre authorization is not required    Availity online for authorization of approval for above.  Notification or Prior Authorization is not required for the requested services    Reference [id number]    Notified Approved Referrals for scheduling

## 2022-04-11 NOTE — TELEPHONE ENCOUNTER
S/w patient to go over Dr. Noel Romero MRI results. Would like to proceed with TPI.      Scheduled appt for 4/15/2022

## 2022-04-15 ENCOUNTER — TELEPHONE (OUTPATIENT)
Dept: PHYSICAL MEDICINE AND REHAB | Facility: CLINIC | Age: 27
End: 2022-04-15

## 2022-04-15 ENCOUNTER — TELEPHONE (OUTPATIENT)
Dept: INTERNAL MEDICINE CLINIC | Facility: CLINIC | Age: 27
End: 2022-04-15

## 2022-04-15 ENCOUNTER — OFFICE VISIT (OUTPATIENT)
Dept: PHYSICAL MEDICINE AND REHAB | Facility: CLINIC | Age: 27
End: 2022-04-15
Payer: COMMERCIAL

## 2022-04-15 DIAGNOSIS — M25.512 CHRONIC LEFT SHOULDER PAIN: Primary | ICD-10-CM

## 2022-04-15 DIAGNOSIS — M79.18 MYOFASCIAL PAIN: ICD-10-CM

## 2022-04-15 DIAGNOSIS — G89.29 CHRONIC LEFT SHOULDER PAIN: Primary | ICD-10-CM

## 2022-04-15 PROCEDURE — 20552 NJX 1/MLT TRIGGER POINT 1/2: CPT | Performed by: PHYSICAL MEDICINE & REHABILITATION

## 2022-04-15 RX ORDER — TRIAMCINOLONE ACETONIDE 40 MG/ML
20 INJECTION, SUSPENSION INTRA-ARTICULAR; INTRAMUSCULAR ONCE
Status: COMPLETED | OUTPATIENT
Start: 2022-04-15 | End: 2022-04-15

## 2022-04-15 RX ORDER — LIDOCAINE HYDROCHLORIDE 10 MG/ML
2.5 INJECTION, SOLUTION INFILTRATION; PERINEURAL ONCE
Status: COMPLETED | OUTPATIENT
Start: 2022-04-15 | End: 2022-04-15

## 2022-04-15 RX ORDER — DULOXETIN HYDROCHLORIDE 20 MG/1
20 CAPSULE, DELAYED RELEASE ORAL DAILY
Qty: 30 CAPSULE | Refills: 0 | Status: SHIPPED | OUTPATIENT
Start: 2022-04-15

## 2022-04-15 NOTE — PROCEDURES
Procedure note: Trigger point injections  Procedure Diagnosis: Myofascial pain    Summary of Procedure:   Risks and benefits of the procedure were discussed with the patient and consent was obtained. Trigger points were palpated and marked along the left rhomboids. Using betadine swabs, injection sites were cleaned in sterile fashion. Using a 27 gauge 1 1/2\" needle 20mg Kenolog diluted into 2.5cc of 1% Lidocaine was injected into the trigger points with 0.8-1 ml into each trigger point. There was negative aspiration of heme and no paresthesias were elicited. Patient tolerated the procedure well. Persistent shoulder pain. Referred to Kristofer Amaya, Joao Waller for second opinion, orthopedic surgery. Consider trial of duloxetine; would need to stop lexapro, would need to discuss this with PCP.     Denisse Leigh DO  Physical Medicine and [facility] [facility] [address]

## 2022-04-15 NOTE — TELEPHONE ENCOUNTER
Discussed case with Dr. Luz Marina Christie, who is agreeable for me to have him stop his Lexapro, start duloxetine 20 mg daily for persistent myofascial pain; may also help with symptoms of anxiety and depression. I discussed this over the phone with the patient, who is also agreeable. He was instructed to start the duloxetine today, since the last dose of Lexapro was yesterday, and to stop the lexapro from now on. He is no longer taking paroxetine; medication list updated. He will contact me in 1 week with a status update.     Samir Cortez DO  Physical Medicine and [address] [place] [address]

## 2022-04-15 NOTE — TELEPHONE ENCOUNTER
Dr. Vandana Hemphill stated he wants to talk to AD regarding this pt's medication. I advised AD is out and if he wanted to talk to anyone else, dr stated he left him a staff message as well.  ERNESTO

## 2022-04-18 ENCOUNTER — TELEPHONE (OUTPATIENT)
Dept: INTERNAL MEDICINE CLINIC | Facility: CLINIC | Age: 27
End: 2022-04-18

## 2022-04-18 RX ORDER — HYDROCODONE BITARTRATE AND ACETAMINOPHEN 5; 325 MG/1; MG/1
1 TABLET ORAL 2 TIMES DAILY PRN
Qty: 28 TABLET | Refills: 0 | Status: SHIPPED | OUTPATIENT
Start: 2022-04-18

## 2022-04-18 RX ORDER — ZOLPIDEM TARTRATE 10 MG/1
10 TABLET ORAL NIGHTLY PRN
Qty: 30 TABLET | Refills: 0 | Status: SHIPPED | OUTPATIENT
Start: 2022-04-18

## 2022-04-18 NOTE — TELEPHONE ENCOUNTER
Per AD, CMA called pharmacy to push through early fill of Suzette Ballard also called pt to make him aware this was done and he will need to pay out of pocket for early fill. Pt stated understanding. Pt also stated he received message from our office regarding weaning off Norco and will start to do that with this fill.    FYI AD

## 2022-04-18 NOTE — TELEPHONE ENCOUNTER
Pt calling stated he has some medication left of the 2 refills he is requesting but is leaving to [place] tomorrow for a week and isn't sure if he'll have cell service. Pt wants to know if he can get refills request filled today before he leaves.  High TE

## 2022-04-18 NOTE — TELEPHONE ENCOUNTER
Last VISIT 12/30/21    Last CPE 06/03/21    Last REFILL 03/21/22   zolpidem 10 MG Oral Tab 30 tablet 0   04/06/22   HYDROcodone-acetaminophen (NORCO) 5-325 MG Oral Tab 28 tablet 0       Last LABS 10/01/21 Covid test done    No future appointments. Please Approve or Deny.

## 2022-04-20 ENCOUNTER — PATIENT MESSAGE (OUTPATIENT)
Dept: PHYSICAL MEDICINE AND REHAB | Facility: CLINIC | Age: 27
End: 2022-04-20

## 2022-04-21 ENCOUNTER — TELEPHONE (OUTPATIENT)
Dept: PHYSICAL MEDICINE AND REHAB | Facility: CLINIC | Age: 27
End: 2022-04-21

## 2022-04-21 NOTE — TELEPHONE ENCOUNTER
LOV 4/15/22 trigger point injections left shoulder. 2/23/22 EMG. New patient initial office visit 2/8/22. No mention of lower back pain. NOV none. Left patient voice mail message to clarify where pain is on right side. Suggested to make NOV. Route to Dr Lane Lora condition update. Route to [facility] to make NOV.

## 2022-04-21 NOTE — TELEPHONE ENCOUNTER
From: Elaine Mo  To: Kelly Oconnor DO  Sent: 4/20/2022 4:09 PM CDT  Subject: Follow up     Hi Dr. Regulo Guzman got an appointment with Patrice Valdez. The other two people you referred said they are knee specialists so I wasn't able to be seen by them. The trigger point injections seems like it's helping with pain more than the other things I've tried. Would I be able to get injections on the right side of my back? The right side is really hurting much worse than the left the past few months.     Antony Su

## 2022-04-21 NOTE — TELEPHONE ENCOUNTER
Can get him set up for trigger point injections, right upper trapezius and rhomboids. Is he taking the duloxetine, and if so has he had any side effects from it?

## 2022-04-21 NOTE — TELEPHONE ENCOUNTER
Initiated authorization for  Trigger point injections right upper trapezius and rhomboids CPT 10254+ with [employer]  Coverage is active  Status: Approved

## 2022-04-25 NOTE — TELEPHONE ENCOUNTER
TCB 3rd attempt    Per Dr. Moira Rodriguez 4/20 T. E-   \"Is he taking the duloxetine, and if so has he had any side effects from it? \"

## 2022-05-02 RX ORDER — HYDROCODONE BITARTRATE AND ACETAMINOPHEN 5; 325 MG/1; MG/1
1 TABLET ORAL 2 TIMES DAILY PRN
Qty: 28 TABLET | Refills: 0 | OUTPATIENT
Start: 2022-05-02

## 2022-05-02 NOTE — TELEPHONE ENCOUNTER
Last OV 12/30/21  Last PE 6/3/21  Last REFILL   Medication Quantity Refills Start End   HYDROcodone-acetaminophen (NORCO) 5-325 MG Oral Tab 28 tablet 0 4/26/2022      Last LABS None on file     No future appointments. Per PROTOCOL?   Not on protocol     Please Advise

## 2022-05-03 NOTE — TELEPHONE ENCOUNTER
Refill Request    Medication request: celecoxib 200 MG Oral Cap    LOV:4/15/2022 Madan Chase DO   Due back to clinic per last office note:  N/A  NOV: Visit date not found      [place]: 3/4/22 #30    Urine drug screen (if applicable): None  Pain contract: None    LOV plan (if weaning or changing medications): None

## 2022-05-04 RX ORDER — CELECOXIB 200 MG/1
200 CAPSULE ORAL DAILY PRN
Qty: 30 CAPSULE | Refills: 0 | Status: SHIPPED | OUTPATIENT
Start: 2022-05-04

## 2022-05-09 ENCOUNTER — OFFICE VISIT (OUTPATIENT)
Dept: FAMILY MEDICINE CLINIC | Facility: CLINIC | Age: 27
End: 2022-05-09
Payer: COMMERCIAL

## 2022-05-09 VITALS
DIASTOLIC BLOOD PRESSURE: 70 MMHG | BODY MASS INDEX: 24.39 KG/M2 | HEIGHT: 73 IN | TEMPERATURE: 99 F | RESPIRATION RATE: 18 BRPM | SYSTOLIC BLOOD PRESSURE: 130 MMHG | OXYGEN SATURATION: 98 % | HEART RATE: 76 BPM | WEIGHT: 184 LBS

## 2022-05-09 DIAGNOSIS — H02.846 SWELLING OF GLAND OF LEFT EYELID: Primary | ICD-10-CM

## 2022-05-09 DIAGNOSIS — T78.40XA ALLERGIC REACTION, INITIAL ENCOUNTER: ICD-10-CM

## 2022-05-09 DIAGNOSIS — Z11.52 ENCOUNTER FOR SCREENING FOR COVID-19: ICD-10-CM

## 2022-05-09 RX ORDER — DULOXETIN HYDROCHLORIDE 20 MG/1
20 CAPSULE, DELAYED RELEASE ORAL DAILY
Qty: 30 CAPSULE | Refills: 0 | Status: SHIPPED | OUTPATIENT
Start: 2022-05-09

## 2022-05-09 RX ORDER — ZOLPIDEM TARTRATE 10 MG/1
10 TABLET ORAL NIGHTLY PRN
Qty: 30 TABLET | Refills: 0 | Status: SHIPPED | OUTPATIENT
Start: 2022-05-09

## 2022-05-09 RX ORDER — HYDROCODONE BITARTRATE AND ACETAMINOPHEN 5; 325 MG/1; MG/1
1 TABLET ORAL 2 TIMES DAILY PRN
Qty: 28 TABLET | Refills: 0 | Status: SHIPPED | OUTPATIENT
Start: 2022-05-09

## 2022-05-09 NOTE — TELEPHONE ENCOUNTER
Refill Request    Medication request: DULoxetine 20 MG Oral Cap DR Particles    LOV:4/15/2022 Sara Flores,    Due back to clinic per last office note:  N/A  NOV: Visit date not found      [place]: 4/15/22 #30    Urine drug screen (if applicable): None  Pain contract: None    LOV plan (if weaning or changing medications): N/A. Condition update: Spoke with patient states that sees no difference with the medication. Patient states that's he's been using an antidepressant so he doesn't mind to stay on this rx. Patient seeing Dr. Geronimo [place].

## 2022-05-09 NOTE — TELEPHONE ENCOUNTER
Last VISIT 12/30/21    Last CPE 06/03/21    Last REFILL 04/18/22   zolpidem 10 MG Oral Tab 30 tablet 0   04/26/22   HYDROcodone-acetaminophen (NORCO) 5-325 MG Oral Tab 28 tablet 0       Last LABS 10/01/21 Covid test done    No future appointments. Per PROTOCOL? Not on protocol      Please Approve or Deny.

## 2022-05-10 DIAGNOSIS — M79.18 MYOFASCIAL PAIN: ICD-10-CM

## 2022-05-10 DIAGNOSIS — G89.29 CHRONIC LEFT SHOULDER PAIN: ICD-10-CM

## 2022-05-10 DIAGNOSIS — M25.512 CHRONIC LEFT SHOULDER PAIN: ICD-10-CM

## 2022-05-10 LAB — SARS-COV-2 RNA RESP QL NAA+PROBE: NOT DETECTED

## 2022-05-10 RX ORDER — DULOXETIN HYDROCHLORIDE 20 MG/1
CAPSULE, DELAYED RELEASE ORAL
Qty: 30 CAPSULE | Refills: 0 | OUTPATIENT
Start: 2022-05-10

## 2022-05-24 ENCOUNTER — MED REC SCAN ONLY (OUTPATIENT)
Dept: NEUROLOGY | Facility: CLINIC | Age: 27
End: 2022-05-24

## 2022-05-24 ENCOUNTER — PATIENT MESSAGE (OUTPATIENT)
Dept: INTERNAL MEDICINE CLINIC | Facility: CLINIC | Age: 27
End: 2022-05-24

## 2022-05-24 RX ORDER — HYDROCODONE BITARTRATE AND ACETAMINOPHEN 5; 325 MG/1; MG/1
1 TABLET ORAL 2 TIMES DAILY PRN
Qty: 28 TABLET | Refills: 0 | Status: CANCELLED | OUTPATIENT
Start: 2022-05-24

## 2022-05-24 NOTE — TELEPHONE ENCOUNTER
AD if you can see him for 20 min we can get him in sooner but the appt was made for 40 min-ok to change to 20 and get in sooner or see other provider?

## 2022-05-24 NOTE — TELEPHONE ENCOUNTER
From: Lew Davis  To: Ximena Amaro MD  Sent: 5/24/2022 11:55 AM CDT  Subject: Upcoming appointment    Ching Brizuela,  I have an appointment scheduled for June 21. I was really hoping to see you sooner than that to start getting off of the [address]. I am not feeling very well recently and I believe the [address] is playing some role in that. I understand if that's the earliest appointment available, but if something else opens up I would come in at anytime. I'm going to meet a doctor at the [facility] on June 7th.  I have a vague idea of the plan going forward but that appointment should clear up the rest.    Thank you,  James Fitzgerald

## 2022-05-25 NOTE — TELEPHONE ENCOUNTER
Future Appointments   Date Time Provider Kalia Reyes   6/2/2022  2:20 PM Eamon Fowler MD [facility]

## 2022-05-25 NOTE — TELEPHONE ENCOUNTER
Last VISIT 12/30/21    Last CPE 06/03/21    Last REFILL 05/09/22 qty 28 w/0 refills    Last LABS 05/09/22 Covid test done    Future Appointments   Date Time Provider Kalia Amy   6/21/2022  9:00 AM Aretha Jones MD [facility]         Per PROTOCOL? Not on protocol      Please Approve or Deny.

## 2022-06-02 ENCOUNTER — OFFICE VISIT (OUTPATIENT)
Dept: INTERNAL MEDICINE CLINIC | Facility: CLINIC | Age: 27
End: 2022-06-02
Payer: COMMERCIAL

## 2022-06-02 VITALS
WEIGHT: 187 LBS | DIASTOLIC BLOOD PRESSURE: 62 MMHG | RESPIRATION RATE: 16 BRPM | SYSTOLIC BLOOD PRESSURE: 116 MMHG | OXYGEN SATURATION: 99 % | HEART RATE: 92 BPM | BODY MASS INDEX: 24.78 KG/M2 | HEIGHT: 73 IN | TEMPERATURE: 98 F

## 2022-06-02 DIAGNOSIS — G89.29 CHRONIC PAIN OF BOTH SHOULDERS: Primary | ICD-10-CM

## 2022-06-02 DIAGNOSIS — M25.511 CHRONIC PAIN OF BOTH SHOULDERS: Primary | ICD-10-CM

## 2022-06-02 DIAGNOSIS — M25.512 CHRONIC PAIN OF BOTH SHOULDERS: Primary | ICD-10-CM

## 2022-06-02 PROCEDURE — 99214 OFFICE O/P EST MOD 30 MIN: CPT | Performed by: INTERNAL MEDICINE

## 2022-06-02 PROCEDURE — 3074F SYST BP LT 130 MM HG: CPT | Performed by: INTERNAL MEDICINE

## 2022-06-02 PROCEDURE — 3078F DIAST BP <80 MM HG: CPT | Performed by: INTERNAL MEDICINE

## 2022-06-02 PROCEDURE — 3008F BODY MASS INDEX DOCD: CPT | Performed by: INTERNAL MEDICINE

## 2022-06-02 RX ORDER — ALPRAZOLAM 0.5 MG/1
0.5 TABLET ORAL DAILY PRN
Qty: 14 TABLET | Refills: 0 | Status: SHIPPED | OUTPATIENT
Start: 2022-06-02 | End: 2022-06-16

## 2022-06-03 DIAGNOSIS — G89.29 CHRONIC LEFT SHOULDER PAIN: ICD-10-CM

## 2022-06-03 DIAGNOSIS — M25.512 CHRONIC LEFT SHOULDER PAIN: ICD-10-CM

## 2022-06-03 RX ORDER — HYDROCODONE BITARTRATE AND ACETAMINOPHEN 5; 325 MG/1; MG/1
1 TABLET ORAL 2 TIMES DAILY PRN
Qty: 20 TABLET | Refills: 0 | Status: SHIPPED | OUTPATIENT
Start: 2022-06-03

## 2022-06-03 NOTE — TELEPHONE ENCOUNTER
Last OV 6.2.22 w/ AD (f/up)   Last PE 6.3.21-Overdue  Last REFILL 5.25.22 Norco 5-325mg #20 0R  Last LABS No recent labs on file     No future appointments. Per PROTOCOL?  Not on protocol     Please Advise

## 2022-06-06 RX ORDER — CELECOXIB 200 MG/1
200 CAPSULE ORAL DAILY PRN
Qty: 30 CAPSULE | Refills: 0 | Status: SHIPPED | OUTPATIENT
Start: 2022-06-06

## 2022-06-08 DIAGNOSIS — M79.18 MYOFASCIAL PAIN: ICD-10-CM

## 2022-06-08 DIAGNOSIS — G89.29 CHRONIC LEFT SHOULDER PAIN: ICD-10-CM

## 2022-06-08 DIAGNOSIS — M25.512 CHRONIC LEFT SHOULDER PAIN: ICD-10-CM

## 2022-06-09 NOTE — TELEPHONE ENCOUNTER
Refill Request    Medication request: DULoxetine 20 MG Oral Cap DR Particles. Take 1 capsule (20 mg total) by mouth daily. Darell Hair, DO   Due back to clinic per last office note: Last office visit was trigger point injections - no follow-up visit specified. NOV: Visit date not found      [place]: 05/14/2022 #30    Urine drug screen (if applicable): none  Pain contract: none     LOV plan (if weaning or changing medications): No mention of changes/weaning to Duloxetine at [facility] note. Messaged patient via [employer] to make follow-up appointment.

## 2022-06-10 ENCOUNTER — TELEPHONE (OUTPATIENT)
Dept: INTERNAL MEDICINE CLINIC | Facility: CLINIC | Age: 27
End: 2022-06-10

## 2022-06-10 RX ORDER — DULOXETIN HYDROCHLORIDE 30 MG/1
CAPSULE, DELAYED RELEASE ORAL
COMMUNITY
Start: 2022-06-07

## 2022-06-10 RX ORDER — DULOXETIN HYDROCHLORIDE 20 MG/1
CAPSULE, DELAYED RELEASE ORAL
Qty: 30 CAPSULE | Refills: 0 | Status: SHIPPED | OUTPATIENT
Start: 2022-06-10

## 2022-06-10 NOTE — TELEPHONE ENCOUNTER
Essence Cabral for clinical updates as requested by pt? Problems   The patient or proxy has not reviewed this information. Medications   The patient or proxy has not reviewed this information, and there are updates pending:   Requested Medication Removals Start Date Reported By  Comments   diclofenac 50 MG [facility] 1/12/2022 Robbie Peraza      Allergies   The patient or proxy has not reviewed this information.

## 2022-07-26 DIAGNOSIS — M25.512 CHRONIC LEFT SHOULDER PAIN: ICD-10-CM

## 2022-07-26 DIAGNOSIS — G89.29 CHRONIC LEFT SHOULDER PAIN: ICD-10-CM

## 2022-07-26 RX ORDER — CELECOXIB 200 MG/1
200 CAPSULE ORAL 2 TIMES DAILY
Qty: 60 CAPSULE | Refills: 0 | Status: SHIPPED | OUTPATIENT
Start: 2022-07-26

## 2022-07-26 NOTE — TELEPHONE ENCOUNTER
Last VISIT 06/02/22    Last CPE 06/03/21    Last REFILL 06/23 22 qty 60 w/0 refills    Last LABS 05/09/22 Covid test done    No future appointments. Per PROTOCOL? Not on protocol      Please Approve or Deny.

## 2023-03-31 ENCOUNTER — TELEPHONE (OUTPATIENT)
Dept: INTERNAL MEDICINE CLINIC | Facility: CLINIC | Age: 28
End: 2023-03-31

## 2023-03-31 RX ORDER — GABAPENTIN 300 MG/1
CAPSULE ORAL
COMMUNITY
Start: 2022-04-01

## 2023-03-31 NOTE — TELEPHONE ENCOUNTER
Patient Review of Clinical Information    Problems   The patient or proxy has not reviewed this information. Medications   The patient or proxy has not reviewed this information, and there are updates pending:   Requested Medication Additions Start Date Reported By  Comments   Neurontin (gabapentin) 300 MG Caps 4/1/2022 Robbie Adelaideker 3 times daily     Allergies   The patient or proxy has not reviewed this information. Historical medication added.

## 2024-02-06 ENCOUNTER — TELEPHONE (OUTPATIENT)
Dept: INTERNAL MEDICINE CLINIC | Facility: CLINIC | Age: 29
End: 2024-02-06

## 2024-02-06 DIAGNOSIS — Z13.0 SCREENING FOR BLOOD DISEASE: ICD-10-CM

## 2024-02-06 DIAGNOSIS — Z13.228 SCREENING FOR METABOLIC DISORDER: ICD-10-CM

## 2024-02-06 DIAGNOSIS — Z13.29 SCREENING FOR THYROID DISORDER: ICD-10-CM

## 2024-02-06 DIAGNOSIS — Z13.220 SCREENING FOR LIPID DISORDERS: ICD-10-CM

## 2024-02-06 DIAGNOSIS — Z00.00 ROUTINE GENERAL MEDICAL EXAMINATION AT A HEALTH CARE FACILITY: Primary | ICD-10-CM

## 2024-02-06 NOTE — TELEPHONE ENCOUNTER
Future Appointments   Date Time Provider Department Center   2/26/2024 11:20 AM Zoltan Suarez MD [facility]     Labs ordered per protocol.

## 2024-02-26 ENCOUNTER — TELEPHONE (OUTPATIENT)
Dept: INTERNAL MEDICINE CLINIC | Facility: CLINIC | Age: 29
End: 2024-02-26

## 2024-10-09 ENCOUNTER — PATIENT MESSAGE (OUTPATIENT)
Dept: INTERNAL MEDICINE CLINIC | Facility: CLINIC | Age: 29
End: 2024-10-09

## (undated) DIAGNOSIS — M25.512 CHRONIC LEFT SHOULDER PAIN: Primary | ICD-10-CM

## (undated) DIAGNOSIS — M25.512 CHRONIC LEFT SHOULDER PAIN: ICD-10-CM

## (undated) DIAGNOSIS — M79.18 MYOFASCIAL PAIN: ICD-10-CM

## (undated) DIAGNOSIS — G89.29 CHRONIC LEFT SHOULDER PAIN: ICD-10-CM

## (undated) DIAGNOSIS — M79.18 MYOFASCIAL PAIN: Primary | ICD-10-CM

## (undated) DIAGNOSIS — G89.29 CHRONIC LEFT SHOULDER PAIN: Primary | ICD-10-CM

## (undated) DIAGNOSIS — G25.89 SCAPULAR DYSKINESIS: ICD-10-CM

## (undated) DEVICE — PAIN TRAY: Brand: MEDLINE INDUSTRIES, INC.

## (undated) DEVICE — SUTURE MONOCRYL 3-0 PS-2

## (undated) DEVICE — STERILE LATEX POWDER-FREE SURGICAL GLOVES WITH HYDROGEL COATING, SMOOTH FINISH, STRAIGHT FINGER: Brand: PROTEXIS

## (undated) DEVICE — 3M™ IOBAN™ 2 ANTIMICROBIAL INCISE DRAPE 6648EZ: Brand: IOBAN™ 2

## (undated) DEVICE — STERILE SYNTHETIC POLYISOPRENE POWDER-FREE SURGICAL GLOVES WITH HYDROGEL COATING, SMOOTH FINISH, STRAIGHT FINGER: Brand: PROTEXIS

## (undated) DEVICE — 3M™ TEGADERM™ +PAD FILM DRESSING WITH NON-ADHERENT PAD, 3587, 3-1/2 IN X 4-1/8 IN (9 CM X 10.5 CM), 25/CAR, 4 CAR/CS: Brand: 3M™ TEGADERM™

## (undated) DEVICE — DRAPE,U/SHT,SPLIT,FILM,60X84,STERILE: Brand: MEDLINE

## (undated) DEVICE — SOL  .9 3000ML

## (undated) DEVICE — SUTURE PASSER AR-4068-45L

## (undated) DEVICE — Device

## (undated) DEVICE — TUBING DW OUTFLOW

## (undated) DEVICE — EXPRESSEW III SUTURE NEEDLE FOR USE WITH EXPRESSEW II OR III SUTURE PASSER: Brand: EXPRESSEW

## (undated) DEVICE — STERILE POLYISOPRENE POWDER-FREE SURGICAL GLOVES: Brand: PROTEXIS

## (undated) DEVICE — 3M™ TEGADERM™ +PAD FILM DRESSING WITH NON-ADHERENT PAD, 3584, 2-3/8 IN X 4 IN (6 CM X 10 CM), 50/CAR, 4 CAR/CS: Brand: 3M™ TEGADERM™

## (undated) DEVICE — GLOVE SURG SENSICARE SZ 7-1/2

## (undated) DEVICE — STERILE POLYISOPRENE POWDER-FREE SURGICAL GLOVES WITH EMOLLIENT COATING: Brand: PROTEXIS

## (undated) DEVICE — [RESECTOR CUTTER, ARTHROSCOPIC SHAVER BLADE,  DO NOT RESTERILIZE,  DO NOT USE IF PACKAGE IS DAMAGED,  KEEP DRY,  KEEP AWAY FROM SUNLIGHT]: Brand: FORMULA

## (undated) DEVICE — GAUZE SPONGES,12 PLY: Brand: CURITY

## (undated) DEVICE — PAD SACRAL SPAN AID

## (undated) DEVICE — SHOULDER ARTHROSCOPY CDS-LF: Brand: MEDLINE INDUSTRIES, INC.

## (undated) DEVICE — 1010 S-DRAPE TOWEL DRAPE 10/BX: Brand: STERI-DRAPE™

## (undated) DEVICE — [AGGRESSIVE PLUS CUTTER, ARTHROSCOPIC SHAVER BLADE,  DO NOT RESTERILIZE,  DO NOT USE IF PACKAGE IS DAMAGED,  KEEP DRY,  KEEP AWAY FROM SUNLIGHT]: Brand: FORMULA

## (undated) DEVICE — BANDAID COVERLET 1X3

## (undated) DEVICE — [TOMCAT CUTTER, ARTHROSCOPIC SHAVER BLADE,  DO NOT RESTERILIZE,  DO NOT USE IF PACKAGE IS DAMAGED,  KEEP DRY,  KEEP AWAY FROM SUNLIGHT]: Brand: FORMULA

## (undated) DEVICE — COVER,MAYO STAND,STERILE: Brand: MEDLINE

## (undated) DEVICE — #15 STERILE STAINLESS BLADE: Brand: STERILE STAINLESS BLADES

## (undated) DEVICE — 5.0 MM I.D. UNIVERSAL CANNULA, 76                                    MM LONG, BLUE, LATEX SEAL,                                    SINGLE-USE STERILE PACKS, BOX OF 10.                                    INCLUDES OBTURATOR AND TROCAR

## (undated) DEVICE — GOWN,SIRUS,FABRIC-REINFORCED,X-LARGE: Brand: MEDLINE

## (undated) DEVICE — DUAL SPIKE ADAPTER: Brand: CONMED

## (undated) DEVICE — KIT TRC TRIMANO BEACH CHR ARM

## (undated) DEVICE — CONVERTORS STOCKINETTE: Brand: CONVERTORS

## (undated) DEVICE — EXOFIN TISSUE ADHESIVE 1.0ML

## (undated) DEVICE — TUBING IRR 16FT CNT WV 3 ASCP

## (undated) DEVICE — SUTURE PASSER AR-4068-90

## (undated) DEVICE — SUPER TURBOVAC 90 IFS: Brand: COBLATION

## (undated) DEVICE — REMOVER DURAPREP 3M

## (undated) DEVICE — SHEET,DRAPE,70X100,STERILE: Brand: MEDLINE

## (undated) DEVICE — SCD SLEEVE KNEE HI BLEND

## (undated) DEVICE — 3M™ STERI-STRIP™ REINFORCED ADHESIVE SKIN CLOSURES, R1547, 1/2 IN X 4 IN (12 MM X 100 MM), 6 STRIPS/ENVELOPE: Brand: 3M™ STERI-STRIP™

## (undated) DEVICE — AVANOS* TUOHY EPIDURAL NEEDLE: Brand: AVANOS

## (undated) DEVICE — ALCOHOL 70% 4 OZ

## (undated) DEVICE — GLOVE SURG SENSICARE SZ 7

## (undated) DEVICE — SUTURE MONOCRYL 2-0 SH

## (undated) NOTE — LETTER
[address] [facility]   Date:   2/23/2022     Name:   Elaine Mo    YOB: 1995   MRN:   [id number]       WHERE IS YOUR PAIN NOW? Sadi the areas on your body where you feel the described sensations. Use the appropriate symbol. Escobar Mandes the areas of radiation. Include all affected areas. Just to complete the picture, please draw in the face. ACHE:  ^ ^ ^   NUMBNESS:  0000   PINS & NEEDLES:  = = = =                              ^ ^ ^                       0000              = = = =                                    ^ ^ ^                       0000            = = = =      BURNING:  XXXX   STABBING: ////                  XXXX                ////                         XXXX          ////     Please sadi the line below indicating your degree of pain right now  with 0 being no pain 10 being the worst pain possible.                                          0             1             2              3             4              5              6              7             8             9             10         Patient Signature:

## (undated) NOTE — LETTER
AUTHORIZATION FOR SURGICAL OPERATION OR OTHER PROCEDURE    1. I hereby authorize Dr. Murali Lemos and the [facility] staff assigned to my case to perform the following operation and/or procedure at the [facility]:    Left rhomboid trigger point injections    2. My physician has explained the nature and purpose of the operation or other procedure, possible alternative methods of treatment, the risks involved, and the possibility of complication to me. I acknowledge that no guarantee has been made as to the result that may be obtained. 3.  I recognize that, during the course of this operation, or other procedure, unforseen conditions may necessitate additional or different procedure than those listed above. I, therefore, further authorize and request that the above named physician, his/her physician assistants or designees perform such procedures as are, in his/her professional opinion, necessary and desirable. 4.  Any tissue or organs removed in the operation or other procedure may be disposed of by and at the discretion of the [facility] staff and [facility]. 5.  I understand that in the event of a medical emergency, I will be transported by local paramedics to [facility] or other hospital emergency department. 6.  I certify that I have read and fully understand the above consent to operation and/or other procedure. 7.  I acknowledge that my physician has explained sedation/analgesia administration to me including the risks and benefits. I consent to the administration of sedation/analgesia as may be necessary or desirable in the judgement of my physician. Witness signature: ___________________________________________________ Date:  ______/______/_____                    Time:  ________ A. M.  P.M.        Patient Name:  Janis Duval  11/25/1995  [id number]         Patient signature:  ___________________________________________________               Statement of Physician  My signature below affirms that prior to the time of the procedure, I have explained to the patient and/or his/her guardian, the risks and benefits involved in the proposed treatment and any reasonable alternative to the proposed treatment. I have also explained the risks and benefits involved in the refusal of the proposed treatment and have answered the patient's questions.                         Date:  ______/______/_______  Provider                      Signature:  __________________________________________________________       Time:  ___________ A.M    P.M.

## (undated) NOTE — LETTER
[address]   Date:   2/8/2022     Name:   Tova Ritter    YOB: 1995   MRN:   [id number]       WHERE IS YOUR PAIN NOW? Sadi the areas on your body where you feel the described sensations. Use the appropriate symbol. Blue Hung the areas of radiation. Include all affected areas. Just to complete the picture, please draw in the face. ACHE:  ^ ^ ^   NUMBNESS:  0000   PINS & NEEDLES:  = = = =                              ^ ^ ^                       0000              = = = =                                    ^ ^ ^                       0000            = = = =      BURNING:  XXXX   STABBING: ////                  XXXX                ////                         XXXX          ////     Please sadi the line below indicating your degree of pain right now  with 0 being no pain 10 being the worst pain possible.                                          0             1             2              3             4              5              6              7             8             9             10         Patient Signature:

## (undated) NOTE — LETTER
07/03/21        Blu Dave  [address] [address]      Dear Camille Read,    [facility] records indicate that you have outstanding lab work and or testing that was ordered for you and has not yet been completed:  Orders Placed This Encounter      CBC Wi

## (undated) NOTE — LETTER
Date: 1/25/2022    Patient Name: Jasmin Delarosa          To Whom it may concern: This letter has been written at the patient's request. The above patient was seen at one of the [facility] locations for treatment of a medical condition.

## (undated) NOTE — LETTER
2/26/2024    [place] [address]    Dear Shai,    We would like to inform you that your account has been charged $40 for not showing up to the office for your scheduled appointment on 2-26-24.    Our no-show policy is as follows: A 24-hour notice is required, or you may be charged a $40 No Show fee.      If you are unable to keep your scheduled appointment, please notify us at least 24 hours in advance so we can accommodate our other patients. You may also reschedule your appointment at that time.    On the third no-show, within a 12-month period, it will be the physician’s discretion as to whether a discharge letter will be sent out disengaging you from the practice and giving you 30 days to enroll with a new non [facility] physician.    If you need any assistance in attending your appointments, please call Patient Experience at [phone] so that we may help you identify possible solutions.    Sincerely,  [facility]

## (undated) NOTE — LETTER
Date & Time: 4/12/2021, 6:18 PM  Patient: Juan Ramon Mckeon  Encounter Provider(s):    DEYANIRA Mann         This certifies that IJuan Ramon Speaks, a patient at an [facility] facility, am leaving the facility voluntarily and against the a